# Patient Record
Sex: FEMALE | Race: WHITE | NOT HISPANIC OR LATINO | Employment: OTHER | URBAN - METROPOLITAN AREA
[De-identification: names, ages, dates, MRNs, and addresses within clinical notes are randomized per-mention and may not be internally consistent; named-entity substitution may affect disease eponyms.]

---

## 2023-01-30 PROBLEM — K21.9 GERD (GASTROESOPHAGEAL REFLUX DISEASE): Status: ACTIVE | Noted: 2023-01-30

## 2023-01-30 PROBLEM — E78.5 HLD (HYPERLIPIDEMIA): Status: ACTIVE | Noted: 2023-01-30

## 2023-01-30 PROBLEM — M16.11 PRIMARY OSTEOARTHRITIS OF RIGHT HIP: Status: ACTIVE | Noted: 2023-01-30

## 2023-01-30 PROBLEM — M85.80 OSTEOPENIA: Status: ACTIVE | Noted: 2023-01-30

## 2023-01-30 PROBLEM — E55.9 VITAMIN D DEFICIENCY: Status: ACTIVE | Noted: 2023-01-30

## 2023-01-30 PROBLEM — E03.9 HYPOTHYROIDISM: Status: ACTIVE | Noted: 2023-01-30

## 2023-01-30 PROBLEM — I45.10 INCOMPLETE RIGHT BUNDLE BRANCH BLOCK (RBBB): Status: ACTIVE | Noted: 2023-01-30

## 2023-01-30 PROBLEM — K57.30 DIVERTICULOSIS OF SIGMOID COLON: Status: ACTIVE | Noted: 2023-01-30

## 2023-01-30 PROBLEM — D47.3 ESSENTIAL THROMBOCYTHEMIA (MULTI): Status: ACTIVE | Noted: 2023-01-30

## 2023-01-30 RX ORDER — FOLIC ACID 1 MG/1
1 TABLET ORAL DAILY
COMMUNITY
Start: 2022-05-28 | End: 2023-03-31 | Stop reason: ALTCHOICE

## 2023-01-30 RX ORDER — LANOLIN ALCOHOL/MO/W.PET/CERES
1 CREAM (GRAM) TOPICAL DAILY
COMMUNITY
Start: 2022-05-28 | End: 2023-03-31 | Stop reason: ALTCHOICE

## 2023-01-30 RX ORDER — ATORVASTATIN CALCIUM 40 MG/1
1 TABLET, FILM COATED ORAL DAILY
COMMUNITY
Start: 2016-11-10 | End: 2023-03-31 | Stop reason: SDUPTHER

## 2023-01-30 RX ORDER — LEVOTHYROXINE SODIUM 100 UG/1
1 TABLET ORAL DAILY
COMMUNITY
Start: 2014-03-16 | End: 2023-03-31 | Stop reason: SDUPTHER

## 2023-01-30 RX ORDER — HYDROXYUREA 500 MG/1
1 CAPSULE ORAL DAILY
COMMUNITY
Start: 2022-08-16 | End: 2024-04-25 | Stop reason: SDUPTHER

## 2023-01-30 RX ORDER — ALENDRONATE SODIUM 35 MG/1
35 TABLET ORAL
COMMUNITY
Start: 2019-04-01 | End: 2023-03-31 | Stop reason: SDUPTHER

## 2023-01-30 RX ORDER — CHOLECALCIFEROL (VITAMIN D3) 50 MCG
1 TABLET ORAL DAILY
COMMUNITY

## 2023-03-14 ENCOUNTER — APPOINTMENT (OUTPATIENT)
Dept: PRIMARY CARE | Facility: CLINIC | Age: 78
End: 2023-03-14
Payer: MEDICARE

## 2023-03-31 ENCOUNTER — OFFICE VISIT (OUTPATIENT)
Dept: PRIMARY CARE | Facility: CLINIC | Age: 78
End: 2023-03-31
Payer: MEDICARE

## 2023-03-31 VITALS
DIASTOLIC BLOOD PRESSURE: 80 MMHG | TEMPERATURE: 97.5 F | WEIGHT: 169 LBS | HEIGHT: 67 IN | SYSTOLIC BLOOD PRESSURE: 122 MMHG | BODY MASS INDEX: 26.53 KG/M2

## 2023-03-31 DIAGNOSIS — Z78.0 POSTMENOPAUSAL: ICD-10-CM

## 2023-03-31 DIAGNOSIS — E03.9 HYPOTHYROIDISM, UNSPECIFIED TYPE: Primary | ICD-10-CM

## 2023-03-31 DIAGNOSIS — D47.3 ESSENTIAL THROMBOCYTHEMIA (MULTI): ICD-10-CM

## 2023-03-31 DIAGNOSIS — K21.9 GASTROESOPHAGEAL REFLUX DISEASE WITHOUT ESOPHAGITIS: ICD-10-CM

## 2023-03-31 DIAGNOSIS — E55.9 VITAMIN D DEFICIENCY: ICD-10-CM

## 2023-03-31 DIAGNOSIS — M85.80 OSTEOPENIA, UNSPECIFIED LOCATION: ICD-10-CM

## 2023-03-31 PROBLEM — M16.10 PRIMARY LOCALIZED OSTEOARTHRITIS OF PELVIC REGION AND THIGH: Status: ACTIVE | Noted: 2022-06-09

## 2023-03-31 LAB
ALANINE AMINOTRANSFERASE (SGPT) (U/L) IN SER/PLAS: 12 U/L (ref 7–45)
ALBUMIN (G/DL) IN SER/PLAS: 4.1 G/DL (ref 3.4–5)
ALKALINE PHOSPHATASE (U/L) IN SER/PLAS: 68 U/L (ref 33–136)
ANION GAP IN SER/PLAS: 16 MMOL/L (ref 10–20)
ASPARTATE AMINOTRANSFERASE (SGOT) (U/L) IN SER/PLAS: 14 U/L (ref 9–39)
BILIRUBIN TOTAL (MG/DL) IN SER/PLAS: 0.7 MG/DL (ref 0–1.2)
CALCIDIOL (25 OH VITAMIN D3) (NG/ML) IN SER/PLAS: 81 NG/ML
CALCIUM (MG/DL) IN SER/PLAS: 8.8 MG/DL (ref 8.6–10.6)
CARBON DIOXIDE, TOTAL (MMOL/L) IN SER/PLAS: 26 MMOL/L (ref 21–32)
CHLORIDE (MMOL/L) IN SER/PLAS: 108 MMOL/L (ref 98–107)
CHOLESTEROL (MG/DL) IN SER/PLAS: 154 MG/DL (ref 0–199)
CHOLESTEROL IN HDL (MG/DL) IN SER/PLAS: 69.9 MG/DL
CHOLESTEROL/HDL RATIO: 2.2
CREATININE (MG/DL) IN SER/PLAS: 0.92 MG/DL (ref 0.5–1.05)
ERYTHROCYTE DISTRIBUTION WIDTH (RATIO) BY AUTOMATED COUNT: 12.5 % (ref 11.5–14.5)
ERYTHROCYTE MEAN CORPUSCULAR HEMOGLOBIN CONCENTRATION (G/DL) BY AUTOMATED: 31.8 G/DL (ref 32–36)
ERYTHROCYTE MEAN CORPUSCULAR VOLUME (FL) BY AUTOMATED COUNT: 117 FL (ref 80–100)
ERYTHROCYTES (10*6/UL) IN BLOOD BY AUTOMATED COUNT: 3.61 X10E12/L (ref 4–5.2)
GFR FEMALE: 64 ML/MIN/1.73M2
GLUCOSE (MG/DL) IN SER/PLAS: 93 MG/DL (ref 74–99)
HEMATOCRIT (%) IN BLOOD BY AUTOMATED COUNT: 42.2 % (ref 36–46)
HEMOGLOBIN (G/DL) IN BLOOD: 13.4 G/DL (ref 12–16)
LDL: 74 MG/DL (ref 0–99)
LEUKOCYTES (10*3/UL) IN BLOOD BY AUTOMATED COUNT: 5.1 X10E9/L (ref 4.4–11.3)
NRBC (PER 100 WBCS) BY AUTOMATED COUNT: 0 /100 WBC (ref 0–0)
PLATELETS (10*3/UL) IN BLOOD AUTOMATED COUNT: 316 X10E9/L (ref 150–450)
POTASSIUM (MMOL/L) IN SER/PLAS: 4.8 MMOL/L (ref 3.5–5.3)
PROTEIN TOTAL: 6.5 G/DL (ref 6.4–8.2)
SODIUM (MMOL/L) IN SER/PLAS: 145 MMOL/L (ref 136–145)
THYROTROPIN (MIU/L) IN SER/PLAS BY DETECTION LIMIT <= 0.05 MIU/L: 1.62 MIU/L (ref 0.44–3.98)
TRIGLYCERIDE (MG/DL) IN SER/PLAS: 52 MG/DL (ref 0–149)
UREA NITROGEN (MG/DL) IN SER/PLAS: 17 MG/DL (ref 6–23)
VLDL: 10 MG/DL (ref 0–40)

## 2023-03-31 PROCEDURE — 85027 COMPLETE CBC AUTOMATED: CPT

## 2023-03-31 PROCEDURE — 99214 OFFICE O/P EST MOD 30 MIN: CPT | Performed by: FAMILY MEDICINE

## 2023-03-31 PROCEDURE — 36415 COLL VENOUS BLD VENIPUNCTURE: CPT | Performed by: FAMILY MEDICINE

## 2023-03-31 PROCEDURE — 1159F MED LIST DOCD IN RCRD: CPT | Performed by: FAMILY MEDICINE

## 2023-03-31 PROCEDURE — 82306 VITAMIN D 25 HYDROXY: CPT

## 2023-03-31 PROCEDURE — 84443 ASSAY THYROID STIM HORMONE: CPT

## 2023-03-31 PROCEDURE — 80061 LIPID PANEL: CPT

## 2023-03-31 PROCEDURE — 1036F TOBACCO NON-USER: CPT | Performed by: FAMILY MEDICINE

## 2023-03-31 PROCEDURE — 80053 COMPREHEN METABOLIC PANEL: CPT

## 2023-03-31 RX ORDER — LEVOTHYROXINE SODIUM 100 UG/1
100 TABLET ORAL
Qty: 90 TABLET | Refills: 3 | Status: SHIPPED | OUTPATIENT
Start: 2023-03-31 | End: 2024-04-24 | Stop reason: SDUPTHER

## 2023-03-31 RX ORDER — ATORVASTATIN CALCIUM 40 MG/1
40 TABLET, FILM COATED ORAL DAILY
Qty: 90 TABLET | Refills: 3 | Status: SHIPPED | OUTPATIENT
Start: 2023-03-31 | End: 2024-04-24 | Stop reason: SDUPTHER

## 2023-03-31 RX ORDER — ALENDRONATE SODIUM 35 MG/1
35 TABLET ORAL
Qty: 12 TABLET | Refills: 3 | Status: SHIPPED | OUTPATIENT
Start: 2023-03-31 | End: 2024-03-12 | Stop reason: SDUPTHER

## 2023-03-31 ASSESSMENT — PATIENT HEALTH QUESTIONNAIRE - PHQ9
SUM OF ALL RESPONSES TO PHQ9 QUESTIONS 1 AND 2: 0
1. LITTLE INTEREST OR PLEASURE IN DOING THINGS: NOT AT ALL
2. FEELING DOWN, DEPRESSED OR HOPELESS: NOT AT ALL

## 2023-03-31 NOTE — PROGRESS NOTES
"Chief complaint:   Chief Complaint   Patient presents with    Follow-up     6 month       HPI:  Fernanda Sotelo is a 78 y.o. female who presents for evaluation and follow up.     Physical exam:  /80 (BP Location: Right arm, Patient Position: Sitting)   Temp 36.4 °C (97.5 °F)   Ht 1.702 m (5' 7\")   Wt 76.7 kg (169 lb)   BMI 26.47 kg/m²   General: NAD, well appearing female  Heart: RRR, no mumur appreciated  Lungs: CTAB, no wheezes, rales, rhonchi  Abdomen: soft, non tender, normoactive BS, no organomegaly  Extremities: No LE edema    Assessment/Plan   Problem List Items Addressed This Visit          Digestive    GERD (gastroesophageal reflux disease)       Musculoskeletal    Osteopenia    Relevant Medications    alendronate (Fosamax) 35 mg tablet    Other Relevant Orders    XR DEXA bone density       Endocrine/Metabolic    Hypothyroidism - Primary    Relevant Medications    atorvastatin (Lipitor) 40 mg tablet    levothyroxine (Synthroid, Levoxyl) 100 mcg tablet    Other Relevant Orders    CBC    Comprehensive metabolic panel    Tsh With Reflex To Free T4 If Abnormal    Lipid panel    Vitamin D deficiency    Relevant Orders    Vitamin D 25-Hydroxy,Total       Hematologic    Essential thrombocythemia (CMS/HCC)     Other Visit Diagnoses       Postmenopausal        Relevant Orders    XR DEXA bone density        Follow up 6 mo   Labs drawn today    Maria De Jesus Mckay, DO        "

## 2023-04-05 ENCOUNTER — TELEPHONE (OUTPATIENT)
Dept: PRIMARY CARE | Facility: CLINIC | Age: 78
End: 2023-04-05
Payer: MEDICARE

## 2023-06-09 ENCOUNTER — TELEPHONE (OUTPATIENT)
Dept: PRIMARY CARE | Facility: CLINIC | Age: 78
End: 2023-06-09
Payer: MEDICARE

## 2023-06-09 NOTE — TELEPHONE ENCOUNTER
----- Message from Maria De Jesus Mckay DO sent at 6/9/2023 12:11 PM EDT -----  This patient has Osteopenia based on this result and should continue taking two TUMS+D per day and continue the Alendronate. I would suggest working out with weights three times weekly for 30 minutes and consuming no more than one coffee, tea, soda or alcoholic drink total, per day.

## 2023-06-13 ENCOUNTER — TELEPHONE (OUTPATIENT)
Dept: PRIMARY CARE | Facility: CLINIC | Age: 78
End: 2023-06-13
Payer: MEDICARE

## 2023-09-26 ENCOUNTER — APPOINTMENT (OUTPATIENT)
Dept: PRIMARY CARE | Facility: CLINIC | Age: 78
End: 2023-09-26
Payer: MEDICARE

## 2023-10-11 ENCOUNTER — APPOINTMENT (OUTPATIENT)
Dept: PRIMARY CARE | Facility: CLINIC | Age: 78
End: 2023-10-11
Payer: MEDICARE

## 2023-10-25 ENCOUNTER — OFFICE VISIT (OUTPATIENT)
Dept: PRIMARY CARE | Facility: CLINIC | Age: 78
End: 2023-10-25
Payer: MEDICARE

## 2023-10-25 VITALS
BODY MASS INDEX: 26.21 KG/M2 | HEIGHT: 67 IN | DIASTOLIC BLOOD PRESSURE: 72 MMHG | WEIGHT: 167 LBS | SYSTOLIC BLOOD PRESSURE: 122 MMHG

## 2023-10-25 DIAGNOSIS — M85.80 OSTEOPENIA, UNSPECIFIED LOCATION: ICD-10-CM

## 2023-10-25 DIAGNOSIS — D47.3 ESSENTIAL THROMBOCYTHEMIA (MULTI): ICD-10-CM

## 2023-10-25 DIAGNOSIS — E78.2 MIXED HYPERLIPIDEMIA: ICD-10-CM

## 2023-10-25 DIAGNOSIS — I45.10 INCOMPLETE RIGHT BUNDLE BRANCH BLOCK (RBBB): ICD-10-CM

## 2023-10-25 DIAGNOSIS — E55.9 VITAMIN D DEFICIENCY: ICD-10-CM

## 2023-10-25 DIAGNOSIS — K57.30 DIVERTICULOSIS OF SIGMOID COLON: ICD-10-CM

## 2023-10-25 DIAGNOSIS — Z00.00 ROUTINE GENERAL MEDICAL EXAMINATION AT HEALTH CARE FACILITY: Primary | ICD-10-CM

## 2023-10-25 DIAGNOSIS — E03.9 HYPOTHYROIDISM, UNSPECIFIED TYPE: ICD-10-CM

## 2023-10-25 DIAGNOSIS — K21.9 GASTROESOPHAGEAL REFLUX DISEASE WITHOUT ESOPHAGITIS: ICD-10-CM

## 2023-10-25 PROCEDURE — G0439 PPPS, SUBSEQ VISIT: HCPCS | Performed by: FAMILY MEDICINE

## 2023-10-25 PROCEDURE — 1036F TOBACCO NON-USER: CPT | Performed by: FAMILY MEDICINE

## 2023-10-25 PROCEDURE — 1159F MED LIST DOCD IN RCRD: CPT | Performed by: FAMILY MEDICINE

## 2023-10-25 PROCEDURE — 1170F FXNL STATUS ASSESSED: CPT | Performed by: FAMILY MEDICINE

## 2023-10-25 PROCEDURE — 1160F RVW MEDS BY RX/DR IN RCRD: CPT | Performed by: FAMILY MEDICINE

## 2023-10-25 ASSESSMENT — ACTIVITIES OF DAILY LIVING (ADL)
MANAGING_FINANCES: INDEPENDENT
GROCERY_SHOPPING: INDEPENDENT
DRESSING: INDEPENDENT
TAKING_MEDICATION: INDEPENDENT
BATHING: INDEPENDENT
DOING_HOUSEWORK: INDEPENDENT

## 2023-10-25 NOTE — PROGRESS NOTES
"Subjective   Reason for Visit: Fernanda Sotelo is an 78 y.o. female here for a Medicare Wellness visit.     Past Medical, Surgical, and Family History reviewed and updated in chart.         HPI  Fernanda Sotelo is a 78 year old female who presents for her MWV. Overall she is doing well.     Patient Care Team:  Maria De Jesus Mckay DO as PCP - General  Maria De Jesus Mckay DO as PCP - [employer] PCP     Review of Systems    Objective   Vitals:  /72   Ht 1.702 m (5' 7\")   Wt 75.8 kg (167 lb)   BMI 26.16 kg/m²       Physical Exam  Constitutional:       Appearance: Normal appearance.   HENT:      Head: Normocephalic and atraumatic.   Cardiovascular:      Rate and Rhythm: Normal rate and regular rhythm.      Heart sounds: Normal heart sounds.   Pulmonary:      Effort: Pulmonary effort is normal.      Breath sounds: Normal breath sounds.   Abdominal:      General: Abdomen is flat.      Palpations: Abdomen is soft.   Skin:     General: Skin is warm and dry.   Neurological:      General: No focal deficit present.      Mental Status: She is alert.         Assessment/Plan   Problem List Items Addressed This Visit       Diverticulosis of sigmoid colon    Essential thrombocythemia (CMS/[facility])    Current Assessment & Plan     - currently on Hydroxyurea  - continue care with hematology: Dr. Johnson         GERD (gastroesophageal reflux disease)    Current Assessment & Plan     - Tums PRN, Prevacid PRN  - follow up if sx 2+ times weekly on average         HLD (hyperlipidemia)    Current Assessment & Plan     - continue Atorvastatin 40 mg PO at bedtime  - last lipids: 3/2023         Hypothyroidism    Current Assessment & Plan     - continue Levothyroxine 100 mcg PO daily  - last labs 3/2023         Incomplete right bundle branch block (RBBB)    Current Assessment & Plan     - last ECHO 6/2022  - continue are with cardiology         Osteopenia    Current Assessment & Plan     - last BMD 6/2021  - has been on Alendronate " since 4/2019         Vitamin D deficiency     Other Visit Diagnoses       Routine general medical examination at health care facility    -  Primary    Relevant Orders    1 Year Follow Up In Primary Care - Wellness Exam            Colon cancer screening: Colonoscopy 11/2017, due 11/2027  Breast cancer screening: last mammogram 11/2021 (age >74)     Reviewed vaccinations, Shingrix discussed and she will look into coverage, Tetanus discussed previously

## 2024-01-02 NOTE — PROGRESS NOTES
Patient ID: Fernanda Sotelo is a 78 y.o. female.    Subjective    HPI  Ms. Sotelo is a 79 y/o F who is presenting for follow-up for polycythemia vera.      NGS MPN revealed a JAK2 mutation, which along with her thrombocytosis, is consistent with essential thrombocytopenia. Patient presents today with questions about essential thrombocythemia.      Interval Events  11/18/2022: Most recent blood work from today, 11/18/2022, shows a hemoglobin of 13.4, hematocrit of 41.7, and her platelets are normal. Today, she reports feeling well. Denies any new side effects  or rashes. She has a good appetite. No feelings of getting full more easily. She does not feel any better or any worse since starting treatment. Patient BP has been slightly elevated. Her BP this morning at home was 138/81. She checks this every couple  of days and this is an average reading for her.      2/17/2023: Most recent blood work from the end of 1/2023 shows that her counts were at goal with hematocrit at 41 and hemoglobin 13.7. Labs from today, 2/17/2023, reveal a WBC of 4.7, hemoglobin 13.5, and hematocrit 40.8, with normal platelets of 700039.  Denies SOB, fatigue, edema, mouth sores, new rashes, and flushing. Of note, she was seen in the ER 2 weeks ago for vertigo. She had woken up to the room spinning and she was unable to stand up. This had never occurred before. Patients BP this morning  at home was approximately 135/81. She takes her BP every other day and it stays around this number.      6/16/2023: Patient presents for follow-up for polycythemia vera. Blood work from today is pending.      Since last visit, patient reports an itchy back. No new ulcers or rashes. Denies any new cough, chest pain or SOB.     9/8/2023: 3 month follow up. Reports doing very well.   Denies n/v/d/c, falls, chest pain, SOB, melena, hematochezia, fevers, chills or skin changes.     1/5/2024: Patient presents for follow-up for polycythemia vera.    Hemoglobin today  is 14. Normal platelets and WBC.     Since last visit, patient reports that overall she has been well. Denies any mouth issues. No new pain. Denies any new chest pain, cough or shortness of breath. No new rashes. No complaints today.     Patient's past medical history, surgical history, family history and social history reviewed.      Objective    Vitals:    01/05/24 1005   BP: 160/83   Pulse: 67   Resp: 16   Temp: 36.2 °C (97.2 °F)   SpO2: 96%       Review of Systems:   Review of Systems:    Positive per HPI, otherwise negative.     Physical Exam:   Constitutional: Patient appears in no acute distress.   Sitting comfortably in chair.  Eyes: EOMI, clear sclera  ENMT: mucous membranes moist, no apparent injury  Head/Neck: Neck supple, no JVD  Respiratory/Thorax: Patent airways, CTAB, normal  breath sounds, no increased work of breathing  Cardiovascular: Regular, rate and rhythm, no murmurs  Gastrointestinal: Nondistended, soft, non-tender,  no rebound tenderness or guarding, no masses palpable  Extremities: normal extremities, no cyanosis edema,  no swelling  Neurological: alert and oriented x3, nonfocal, normal  speech and hearing  Lymphatic: No palpable lymphadenopathy in cervical,  axillary  lymph nodes.  Spleen appears normal size.  Psychological: Appropriate mood and behavior, normal  affect  Skin: Warm and dry, no lesions, no rashes     Performance Status:  Symptomatic; fully ambulatory    Labs/Imaging/Pathology: personally reviewed reports and images in Epic electronic medical record system. Pertinent results as it related to the plan represented in below in assessment and plan.      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  - NGS MPN 7/7/2022 with JAK2 mutation. With this mutation and thrombocytopenia, she meets criteria for essential thrombocytopenia.  - Discussed  diagnosis and overall good prognosis. Discussed treatment would be with hydroxyurea to prevent complications such as clots and stroke.  - 7/28/2022 - Discussed recommendations for treatment with hydroxyurea given her age over 60, however she is reluctant as her  had chemo. Discussed that this would be different but she is scared to start.   - Will start Aspirin 81 daily in combination with regular phlebotomies.    - She may need phlebotomies every month to every few months.  - 8/26/2022 - Hematocrit 45.7.  - Overall tolerating hydroxyurea fine.   - She was previously on B12 and folate.  - We did discuss she can stop these and we will recheck her levels to see if they are still needed.   - I did discuss with her, although she is close to our goal I would like to increase her hydroxyurea some.  - We will increase the hydroxyurea to 2 tablets on the weekends to see if this further improves her levels.  - Macrocytosis secondary to hydroxyurea.  - 9/23/2022 - Today hemoglobin and hematocrit remain at goal. Hematocrit is 43%.  - Overall, continues to tolerate hydroxyurea.   - Her BP was elevated today; however, she takes her BP at home and states it remains in the 120s/70s.  - No splenomegaly on exam.  - Her right lower extremity, which was previously having pain, has now resolved.  - 11/18/2022 - Overall her blood counts from today are at goal: hemoglobin normal at 13.4, hematocrit 41.7, platelets 242, and WBC 4.5.  - We will continue her current dose of hydrea: 1 dose M-F and 2 tablets on the weekends.   - She denies any new symptoms.  - She does have some elevated BP and she is going to keep recordings at home and review with Dr. Mckay next month, 12/2022.      2/17/23:  - Reviewed blood work from today, hemoglobin and hematocrit at goal.   - No issues with her medication. Otherwise, she is doing well.   - We will continue her Hydrea at the current dose and extend her follow-up to 4 months.   - She will call us  with any new or worsening symptoms.   - Today her BP is elevated; however, at home it is at goal below 140/90.  - She will continue to monitor at home and if it starts to rise she will notify us.   - RTC in 4 months with labs day of.     6/16/23:  - Labs from today are pending, however no new side effect to the Hydrea.  - At this point, as long as her hemoglobin is stable we will continue at current dose and follow-up in 3 months.   - RTC in 3 months with CBC, CMP and LDH.     9/8/2023:   - 3 month FUV   - Reports doing well on Hydrea 500 mg on M-F and Hydrea 1000 mg Sundays/Saturdays   - She'd like to come back after the holidays which would be in January 2024, she will call with questions or concerns in the meantime  - At least 20 minutes of direct consultation was spent with the patient today    1/5/24:  - CBC is at goal.   - At this point, we will continue Hydrea 500 mg every day Monday-Friday and 1000 mg on Saturday and Sunday.   - Given she has been doing well with no new toxicity we will spread her follow-up to 4 months.   - RTC in 4 months with blood work day of.     RTC in 4 months with blood work day of. This note has been transcribed using a medical scribe and there is a possibility of unintentional typing misprints.          Arabella Johnson MD  Hematology/Oncology  [facility]    Scribe Attestation  By signing my name below, Jennifer FREEMAN Scribe attest that this documentation has been prepared under the direction and in the presence of Arabella Johnson MD.

## 2024-01-05 ENCOUNTER — LAB (OUTPATIENT)
Dept: LAB | Facility: CLINIC | Age: 79
End: 2024-01-05
Payer: MEDICARE

## 2024-01-05 ENCOUNTER — OFFICE VISIT (OUTPATIENT)
Dept: HEMATOLOGY/ONCOLOGY | Facility: CLINIC | Age: 79
End: 2024-01-05
Payer: MEDICARE

## 2024-01-05 VITALS
BODY MASS INDEX: 25.66 KG/M2 | OXYGEN SATURATION: 96 % | TEMPERATURE: 97.2 F | RESPIRATION RATE: 16 BRPM | DIASTOLIC BLOOD PRESSURE: 83 MMHG | SYSTOLIC BLOOD PRESSURE: 160 MMHG | HEART RATE: 67 BPM | WEIGHT: 163.8 LBS

## 2024-01-05 DIAGNOSIS — D47.3 ESSENTIAL THROMBOCYTHEMIA (MULTI): ICD-10-CM

## 2024-01-05 LAB
ALBUMIN SERPL BCP-MCNC: 4.2 G/DL (ref 3.4–5)
ALP SERPL-CCNC: 54 U/L (ref 33–136)
ALT SERPL W P-5'-P-CCNC: 14 U/L (ref 7–45)
ANION GAP SERPL CALC-SCNC: 11 MMOL/L (ref 10–20)
AST SERPL W P-5'-P-CCNC: 15 U/L (ref 9–39)
BASOPHILS # BLD AUTO: 0.02 X10*3/UL (ref 0–0.1)
BASOPHILS NFR BLD AUTO: 0.4 %
BILIRUB SERPL-MCNC: 0.8 MG/DL (ref 0–1.2)
BUN SERPL-MCNC: 18 MG/DL (ref 6–23)
CALCIUM SERPL-MCNC: 9.5 MG/DL (ref 8.6–10.3)
CHLORIDE SERPL-SCNC: 106 MMOL/L (ref 98–107)
CO2 SERPL-SCNC: 29 MMOL/L (ref 21–32)
CREAT SERPL-MCNC: 0.94 MG/DL (ref 0.5–1.05)
EOSINOPHIL # BLD AUTO: 0.06 X10*3/UL (ref 0–0.4)
EOSINOPHIL NFR BLD AUTO: 1.2 %
ERYTHROCYTE [DISTWIDTH] IN BLOOD BY AUTOMATED COUNT: 13.4 % (ref 11.5–14.5)
GFR SERPL CREATININE-BSD FRML MDRD: 63 ML/MIN/1.73M*2
GIANT PLATELETS BLD QL SMEAR: NORMAL
GLUCOSE SERPL-MCNC: 100 MG/DL (ref 74–99)
HCT VFR BLD AUTO: 42.9 % (ref 36–46)
HGB BLD-MCNC: 14 G/DL (ref 12–16)
IMM GRANULOCYTES # BLD AUTO: 0.01 X10*3/UL (ref 0–0.5)
IMM GRANULOCYTES NFR BLD AUTO: 0.2 % (ref 0–0.9)
LDH SERPL L TO P-CCNC: 167 U/L (ref 84–246)
LYMPHOCYTES # BLD AUTO: 1.16 X10*3/UL (ref 0.8–3)
LYMPHOCYTES NFR BLD AUTO: 23.1 %
MCH RBC QN AUTO: 38 PG (ref 26–34)
MCHC RBC AUTO-ENTMCNC: 32.6 G/DL (ref 32–36)
MCV RBC AUTO: 117 FL (ref 80–100)
MONOCYTES # BLD AUTO: 0.28 X10*3/UL (ref 0.05–0.8)
MONOCYTES NFR BLD AUTO: 5.6 %
NEUTROPHILS # BLD AUTO: 3.5 X10*3/UL (ref 1.6–5.5)
NEUTROPHILS NFR BLD AUTO: 69.5 %
NRBC BLD-RTO: ABNORMAL /100{WBCS}
PATH REVIEW-CBC DIFFERENTIAL: NORMAL
PLATELET # BLD AUTO: 314 X10*3/UL (ref 150–450)
POTASSIUM SERPL-SCNC: 4.1 MMOL/L (ref 3.5–5.3)
PROT SERPL-MCNC: 6.8 G/DL (ref 6.4–8.2)
RBC # BLD AUTO: 3.68 X10*6/UL (ref 4–5.2)
RBC MORPH BLD: NORMAL
SODIUM SERPL-SCNC: 142 MMOL/L (ref 136–145)
WBC # BLD AUTO: 5 X10*3/UL (ref 4.4–11.3)

## 2024-01-05 PROCEDURE — 85060 BLOOD SMEAR INTERPRETATION: CPT | Performed by: PATHOLOGY

## 2024-01-05 PROCEDURE — 99214 OFFICE O/P EST MOD 30 MIN: CPT | Performed by: INTERNAL MEDICINE

## 2024-01-05 PROCEDURE — 36415 COLL VENOUS BLD VENIPUNCTURE: CPT

## 2024-01-05 PROCEDURE — 1126F AMNT PAIN NOTED NONE PRSNT: CPT | Performed by: INTERNAL MEDICINE

## 2024-01-05 PROCEDURE — 83615 LACTATE (LD) (LDH) ENZYME: CPT

## 2024-01-05 PROCEDURE — 1036F TOBACCO NON-USER: CPT | Performed by: INTERNAL MEDICINE

## 2024-01-05 PROCEDURE — 85025 COMPLETE CBC W/AUTO DIFF WBC: CPT

## 2024-01-05 PROCEDURE — 84075 ASSAY ALKALINE PHOSPHATASE: CPT

## 2024-01-05 ASSESSMENT — PAIN SCALES - GENERAL: PAINLEVEL: 0-NO PAIN

## 2024-03-12 ENCOUNTER — TELEPHONE (OUTPATIENT)
Dept: PRIMARY CARE | Facility: CLINIC | Age: 79
End: 2024-03-12
Payer: MEDICARE

## 2024-03-12 DIAGNOSIS — M85.80 OSTEOPENIA, UNSPECIFIED LOCATION: ICD-10-CM

## 2024-03-12 RX ORDER — ALENDRONATE SODIUM 35 MG/1
35 TABLET ORAL
Qty: 12 TABLET | Refills: 3 | Status: SHIPPED | OUTPATIENT
Start: 2024-03-12 | End: 2024-04-24

## 2024-04-22 ENCOUNTER — TELEPHONE (OUTPATIENT)
Dept: HEMATOLOGY/ONCOLOGY | Facility: CLINIC | Age: 79
End: 2024-04-22
Payer: MEDICARE

## 2024-04-22 NOTE — TELEPHONE ENCOUNTER
Refill on Hydroxyurea 500mg.  Send to [facility] on [address] [place].  Patient has 1 week left and doesn't see Dr. Johnson until 5/20/24.

## 2024-04-24 ENCOUNTER — OFFICE VISIT (OUTPATIENT)
Dept: PRIMARY CARE | Facility: CLINIC | Age: 79
End: 2024-04-24
Payer: MEDICARE

## 2024-04-24 VITALS
WEIGHT: 164 LBS | TEMPERATURE: 96.8 F | SYSTOLIC BLOOD PRESSURE: 124 MMHG | BODY MASS INDEX: 25.74 KG/M2 | DIASTOLIC BLOOD PRESSURE: 74 MMHG | HEIGHT: 67 IN

## 2024-04-24 DIAGNOSIS — M85.80 OSTEOPENIA, UNSPECIFIED LOCATION: ICD-10-CM

## 2024-04-24 DIAGNOSIS — E03.9 HYPOTHYROIDISM, UNSPECIFIED TYPE: ICD-10-CM

## 2024-04-24 DIAGNOSIS — Z00.00 ROUTINE GENERAL MEDICAL EXAMINATION AT HEALTH CARE FACILITY: Primary | ICD-10-CM

## 2024-04-24 DIAGNOSIS — E78.2 MIXED HYPERLIPIDEMIA: ICD-10-CM

## 2024-04-24 DIAGNOSIS — E55.9 VITAMIN D DEFICIENCY: ICD-10-CM

## 2024-04-24 PROCEDURE — 1159F MED LIST DOCD IN RCRD: CPT | Performed by: FAMILY MEDICINE

## 2024-04-24 PROCEDURE — 1123F ACP DISCUSS/DSCN MKR DOCD: CPT | Performed by: FAMILY MEDICINE

## 2024-04-24 PROCEDURE — G0439 PPPS, SUBSEQ VISIT: HCPCS | Performed by: FAMILY MEDICINE

## 2024-04-24 PROCEDURE — 1160F RVW MEDS BY RX/DR IN RCRD: CPT | Performed by: FAMILY MEDICINE

## 2024-04-24 PROCEDURE — 1170F FXNL STATUS ASSESSED: CPT | Performed by: FAMILY MEDICINE

## 2024-04-24 PROCEDURE — 99213 OFFICE O/P EST LOW 20 MIN: CPT | Performed by: FAMILY MEDICINE

## 2024-04-24 PROCEDURE — 1036F TOBACCO NON-USER: CPT | Performed by: FAMILY MEDICINE

## 2024-04-24 RX ORDER — LEVOTHYROXINE SODIUM 100 UG/1
100 TABLET ORAL
Qty: 90 TABLET | Refills: 3 | Status: SHIPPED | OUTPATIENT
Start: 2024-04-24

## 2024-04-24 RX ORDER — ATORVASTATIN CALCIUM 40 MG/1
40 TABLET, FILM COATED ORAL DAILY
Qty: 90 TABLET | Refills: 3 | Status: SHIPPED | OUTPATIENT
Start: 2024-04-24

## 2024-04-24 ASSESSMENT — ENCOUNTER SYMPTOMS
POLYPHAGIA: 0
WHEEZING: 0
RHINORRHEA: 0
HEMATURIA: 0
SHORTNESS OF BREATH: 0
ABDOMINAL PAIN: 0
FACIAL SWELLING: 0
SLEEP DISTURBANCE: 0
DYSURIA: 0
CONSTIPATION: 0
DIZZINESS: 0
CHILLS: 0
BRUISES/BLEEDS EASILY: 0
DIARRHEA: 0
DYSPHORIC MOOD: 0
PALPITATIONS: 0
ADENOPATHY: 0
NAUSEA: 0
WOUND: 0
WEAKNESS: 0
NECK PAIN: 0
POLYDIPSIA: 0
SORE THROAT: 0
BACK PAIN: 0
DECREASED CONCENTRATION: 0
FEVER: 0
NUMBNESS: 0
ARTHRALGIAS: 1
HEADACHES: 0
COUGH: 0
VOMITING: 0

## 2024-04-24 ASSESSMENT — ACTIVITIES OF DAILY LIVING (ADL)
GROCERY_SHOPPING: INDEPENDENT
MANAGING_FINANCES: INDEPENDENT
DOING_HOUSEWORK: INDEPENDENT
DRESSING: INDEPENDENT
TAKING_MEDICATION: INDEPENDENT
BATHING: INDEPENDENT

## 2024-04-24 NOTE — PROGRESS NOTES
"Subjective   Reason for Visit: Fernanda Sotelo is an 79 y.o. female here for a Medicare Wellness visit.     Past Medical, Surgical, and Family History reviewed and updated in chart.    Reviewed all medications by prescribing practitioner or clinical pharmacist (such as prescriptions, OTCs, herbal therapies and supplements) and documented in the medical record.    HPI    Fernanda Sotelo is a 79 year old female who presents for evaluation. No acute concerns.    Patient Care Team:  Maria De Jesus Mckay DO as PCP - General  Maria De Jesus Mckay DO as PCP - [employer] PCP  Arabella Johnson MD as Consulting Physician (Hematology and Oncology)     Review of Systems   Constitutional:  Negative for chills and fever.   HENT:  Negative for facial swelling, rhinorrhea and sore throat.    Eyes:  Negative for visual disturbance.   Respiratory:  Negative for cough, shortness of breath and wheezing.    Cardiovascular:  Negative for chest pain, palpitations and leg swelling.   Gastrointestinal:  Negative for abdominal pain, constipation, diarrhea, nausea and vomiting.   Endocrine: Negative for polydipsia, polyphagia and polyuria.   Genitourinary:  Negative for dysuria and hematuria.   Musculoskeletal:  Positive for arthralgias (mild). Negative for back pain and neck pain.   Skin:  Negative for rash and wound.   Neurological:  Negative for dizziness, weakness, numbness and headaches.   Hematological:  Negative for adenopathy. Does not bruise/bleed easily.   Psychiatric/Behavioral:  Negative for decreased concentration, dysphoric mood and sleep disturbance.        Objective   Vitals:  /74   Temp 36 °C (96.8 °F)   Ht 1.689 m (5' 6.5\")   Wt 74.4 kg (164 lb)   BMI 26.07 kg/m²       Physical Exam  Constitutional:       Appearance: Normal appearance.   HENT:      Head: Normocephalic and atraumatic.      Right Ear: Tympanic membrane normal.      Left Ear: Tympanic membrane normal.      Nose: Nose normal.      Mouth/Throat:     "  Mouth: Mucous membranes are moist.   Eyes:      Conjunctiva/sclera: Conjunctivae normal.   Cardiovascular:      Rate and Rhythm: Normal rate and regular rhythm.      Pulses: Normal pulses.      Heart sounds: Normal heart sounds.   Pulmonary:      Effort: Pulmonary effort is normal.      Breath sounds: Normal breath sounds.   Abdominal:      General: Abdomen is flat. Bowel sounds are normal.      Palpations: Abdomen is soft.   Skin:     General: Skin is warm and dry.      Capillary Refill: Capillary refill takes less than 2 seconds.   Neurological:      General: No focal deficit present.      Mental Status: She is alert.   Psychiatric:         Mood and Affect: Mood normal.         Assessment/Plan   Problem List Items Addressed This Visit       HLD (hyperlipidemia)    Current Assessment & Plan     - continue Atorvastatin 40 mg PO at bedtime  - last lipids: 3/2023, ordered repeat testing         Hypothyroidism    Current Assessment & Plan     - continue Levothyroxine 100 mcg PO daily  - last labs 3/2023, repeat testing ordered         Relevant Medications    atorvastatin (Lipitor) 40 mg tablet    levothyroxine (Synthroid, Levoxyl) 100 mcg tablet    Other Relevant Orders    Tsh With Reflex To Free T4 If Abnormal    Lipid panel    Osteopenia    Current Assessment & Plan     - last BMD 6/2/2023  - has been on Alendronate since 4/2019, will stop therapy (4/2024)         Vitamin D deficiency    Current Assessment & Plan     -ordered labs, continue supplementation with 2000 international units  once daily         Relevant Orders    Vitamin D 25-Hydroxy,Total (for eval of Vitamin D levels)     Other Visit Diagnoses       Routine general medical examination at health care facility    -  Primary    Relevant Orders    1 Year Follow Up In Primary Care - Wellness Exam        She is age > 74 and comfortable not continuing breast cancer screening. Last mammogram 2021 and wnl  Last colonoscopy 11/20217, due 11/2027  Shingrix  vaccinations again discussed, she will look into getting this  Tdap booster is recommended and she will look into getting this.

## 2024-04-25 DIAGNOSIS — D47.3 ESSENTIAL THROMBOCYTHEMIA (MULTI): Primary | ICD-10-CM

## 2024-04-25 RX ORDER — HYDROXYUREA 500 MG/1
CAPSULE ORAL
Qty: 115 CAPSULE | Refills: 1 | Status: SHIPPED | OUTPATIENT
Start: 2024-04-25

## 2024-04-25 NOTE — TELEPHONE ENCOUNTER
Patient following up on this- went to pharmacy today and they did not have an order.  [facility] on [address]  Please call patient when sent.

## 2024-04-25 NOTE — TELEPHONE ENCOUNTER
Fernanda Sotelo is a 79 y.o. year old female patient who had a patient care encounter with Dr. Johnson most recently on 1/5/24 for ongoing management of her MF.  Fernanda is currently being treated with hydroxyurea.    Most recent labs on 1/5/24:  WBC 5.0, ANC 3.50, H/H 14.0/42.9, PLTs 314;  CMP stable, SCr 0.94.  Continue current therapy.    Per collaborative practice agreement with Dr. Johnson, on 4/25/24 I refilled hydroxyurea 500 mg PO once daily M - F and 1000 mg PO once daily Sat - Sun, 90-day cycle.  Qty # 115 with 1 refill.  The prescription was sent to [facility].    Next FUV is 5/20/24.      Dionte Smith RP, MS, BCOP  Clinical Pharmacist Specialist - Ambulatory Oncology

## 2024-04-26 ENCOUNTER — APPOINTMENT (OUTPATIENT)
Dept: PRIMARY CARE | Facility: CLINIC | Age: 79
End: 2024-04-26
Payer: MEDICARE

## 2024-05-16 NOTE — PROGRESS NOTES
Patient ID: Fernanda Sotelo is a 79 y.o. female.    Subjective    HPI  Ms. Sotelo is a 80 y/o F who is presenting for follow-up for polycythemia vera.      NGS MPN revealed a JAK2 mutation, which along with her thrombocytosis, is consistent with essential thrombocytopenia. Patient presents today with questions about essential thrombocythemia.      Interval Events  11/18/2022: Most recent blood work from today, 11/18/2022, shows a hemoglobin of 13.4, hematocrit of 41.7, and her platelets are normal. Today, she reports feeling well. Denies any new side effects  or rashes. She has a good appetite. No feelings of getting full more easily. She does not feel any better or any worse since starting treatment. Patient BP has been slightly elevated. Her BP this morning at home was 138/81. She checks this every couple  of days and this is an average reading for her.      2/17/2023: Most recent blood work from the end of 1/2023 shows that her counts were at goal with hematocrit at 41 and hemoglobin 13.7. Labs from today, 2/17/2023, reveal a WBC of 4.7, hemoglobin 13.5, and hematocrit 40.8, with normal platelets of [id number].  Denies SOB, fatigue, edema, mouth sores, new rashes, and flushing. Of note, she was seen in the ER 2 weeks ago for vertigo. She had woken up to the room spinning and she was unable to stand up. This had never occurred before. Patients BP this morning  at home was approximately 135/81. She takes her BP every other day and it stays around this number.      6/16/2023: Patient presents for follow-up for polycythemia vera. Blood work from today is pending.      Since last visit, patient reports an itchy back. No new ulcers or rashes. Denies any new cough, chest pain or SOB.      9/8/2023: 3 month follow up. Reports doing very well.   Denies n/v/d/c, falls, chest pain, SOB, melena, hematochezia, fevers, chills or skin changes.      1/5/2024: Patient presents for follow-up for polycythemia vera.     Hemoglobin  today is 14. Normal platelets and WBC.      Since last visit, patient reports that overall she has been well. Denies any mouth issues. No new pain. Denies any new chest pain, cough or shortness of breath. No new rashes. No complaints today.     5/20/24: Patient presents for follow-up for polycythemia vera.     Blood work from today is still pending.     Since last visit patient reports that she is feeling well. She recently sold her home and bought a condo and looks forward to this change. She does have some reflux that is not new to her and resolves when she takes a couple of Tums. Her PCP recently took her off of her Fosamax and she wonders how her reflux will do. No itchy or rash on skin. She states that once in a while she puts some cream on her back for a slight itch but attributes this to dry skin and no other issue. No feelings of being full or new bloating. No other major complaints at this time.     Patient's past medical history, surgical history, family history and social history reviewed.    Review of Systems:   Review of Systems:    Positive per HPI, otherwise negative.   Objective      Vitals:    05/20/24 1121   BP: 152/77   Pulse: 71   Resp: 16   Temp: 36.4 °C (97.5 °F)   SpO2: 95%          Physical Exam  Gen: appears well in clinic, NAD  HEENT: atraumatic head, normocephalic, EOMI, conjunctiva normal  LUNG: no increased WOB, CTAB  CV: No JVD. RRR  GI: soft, NT, ND  LE: no LE edema  Skin: no obvious rashes or lesions on visible skin  Neuro: interactive, no focal deficits noted  Psych: normal mood and affect  Performance Status:  Symptomatic; fully ambulatory    Labs/Imaging/Pathology: personally reviewed reports and images in Epic electronic medical record system. Pertinent results as it related to the plan represented in below in assessment and plan.   Assessment/Plan   1.Polycythemia vera with Stepan 2 mutation  2.Thrombocytosis  - She has had elevated hematocrit since at least 2019 and since May [id number]  has had an elevated platelet count.  - NGS MPN 7/7/2022 with JAK2 mutation. With this mutation and thrombocytopenia, she meets criteria for essential thrombocytopenia.  - Discussed diagnosis and overall good prognosis. Discussed treatment would be with hydroxyurea to prevent complications such as clots and stroke.  - 7/28/2022 - Discussed recommendations for treatment with hydroxyurea given her age over 60, however she is reluctant as her  had chemo. Discussed that this would be different but she is scared to start.   - Will start Aspirin 81 daily in combination with regular phlebotomies.    - She may need phlebotomies every month to every few months.  - 8/26/2022 - Hematocrit 45.7.  - Overall tolerating hydroxyurea fine.   - She was previously on B12 and folate.  - We did discuss she can stop these and we will recheck her levels to see if they are still needed.   - I did discuss with her, although she is close to our goal I would like to increase her hydroxyurea some.  - We will increase the hydroxyurea to 2 tablets on the weekends to see if this further improves her levels.  - Macrocytosis secondary to hydroxyurea.  - 9/23/2022 - Today hemoglobin and hematocrit remain at goal. Hematocrit is 43%.  - Overall, continues to tolerate hydroxyurea.   - Her BP was elevated today; however, she takes her BP at home and states it remains in the 120s/70s.  - No splenomegaly on exam.  - Her right lower extremity, which was previously having pain, has now resolved.  - 11/18/2022 - Overall her blood counts from today are at goal: hemoglobin normal at 13.4, hematocrit 41.7, platelets 242, and WBC 4.5.  - We will continue her current dose of hydrea: 1 dose M-F and 2 tablets on the weekends.   - She denies any new symptoms.  - She does have some elevated BP and she is going to keep recordings at home and review with Dr. Mckay next month, 12/2022.      2/17/23:  - Reviewed blood work from today, hemoglobin and hematocrit at  goal.   - No issues with her medication. Otherwise, she is doing well.   - We will continue her Hydrea at the current dose and extend her follow-up to 4 months.   - She will call us with any new or worsening symptoms.   - Today her BP is elevated; however, at home it is at goal below 140/90.  - She will continue to monitor at home and if it starts to rise she will notify us.   - RTC in 4 months with labs day of.     6/16/23:  - Labs from today are pending, however no new side effect to the Hydrea.  - At this point, as long as her hemoglobin is stable we will continue at current dose and follow-up in 3 months.   - RTC in 3 months with CBC, CMP and LDH.     9/8/2023:   - 3 month FUV   - Reports doing well on Hydrea 500 mg on M-F and Hydrea 1000 mg Sundays/Saturdays   - She'd like to come back after the holidays which would be in January 2024, she will call with questions or concerns in the meantime  - At least 20 minutes of direct consultation was spent with the patient today     1/5/24:  - CBC is at goal.   - At this point, we will continue Hydrea 500 mg every day Monday-Friday and 1000 mg on Saturday and Sunday.   - Given she has been doing well with no new toxicity we will spread her follow-up to 4 months.   - RTC in 4 months with blood work day of.     5/20/24:  - Overall patient is tolerating hydrea with no new toxicity.  - Hbg is at goal at 14.2.  - Hematocrit is 43.3.  - Platelets are normal.  - WBC is normal.   - At this point we will continue Hydrea 500 mg Monday-Friday and 1000 mg on Saturday and Sunday.  - No splenomegaly on exam.  - RTC in 4 months with labs.      RTC in 4 moths with labs. This note has been transcribed using a medical scribe and there is a possibility of unintentional typing misprints.       Diagnoses and all orders for this visit:  Essential thrombocythemia (Multi)  -     Clinic Appointment Request  -     TSH with reflex to Free T4 if abnormal; Future  -     Clinic Appointment Request;  Future  -     CBC and Auto Differential; Future  -     Comprehensive metabolic panel; Future  Hypothyroidism due to Hashimoto's thyroiditis  -     TSH with reflex to Free T4 if abnormal; Future  Arabella Johnson MD  Hematology/Oncology  [facility] [employer] Attestation  By signing my name below, I, Burt Leo attest that this documentation has been prepared under the direction and in the presence of Arabella Johnson MD.     Time Spent  Prep time on day of patient encounter: 5 minutes  Time spent directly with patient, family or caregiver: 15 minutes  Additional Time Spent on Patient Care Activities: 5 minutes  Documentation Time: 5 minutes  Other Time Spent: 0 minutes  Total: 30 minutes

## 2024-05-20 ENCOUNTER — LAB (OUTPATIENT)
Dept: LAB | Facility: CLINIC | Age: 79
End: 2024-05-20
Payer: MEDICARE

## 2024-05-20 ENCOUNTER — OFFICE VISIT (OUTPATIENT)
Dept: HEMATOLOGY/ONCOLOGY | Facility: CLINIC | Age: 79
End: 2024-05-20
Payer: MEDICARE

## 2024-05-20 VITALS
HEART RATE: 71 BPM | TEMPERATURE: 97.5 F | OXYGEN SATURATION: 95 % | DIASTOLIC BLOOD PRESSURE: 77 MMHG | BODY MASS INDEX: 26.08 KG/M2 | SYSTOLIC BLOOD PRESSURE: 152 MMHG | WEIGHT: 164.02 LBS | RESPIRATION RATE: 16 BRPM

## 2024-05-20 DIAGNOSIS — E06.3 HYPOTHYROIDISM DUE TO HASHIMOTO'S THYROIDITIS: ICD-10-CM

## 2024-05-20 DIAGNOSIS — D47.3 ESSENTIAL THROMBOCYTHEMIA (MULTI): ICD-10-CM

## 2024-05-20 DIAGNOSIS — E55.9 VITAMIN D DEFICIENCY: ICD-10-CM

## 2024-05-20 DIAGNOSIS — E03.8 HYPOTHYROIDISM DUE TO HASHIMOTO'S THYROIDITIS: ICD-10-CM

## 2024-05-20 DIAGNOSIS — E03.9 HYPOTHYROIDISM, UNSPECIFIED TYPE: ICD-10-CM

## 2024-05-20 LAB
25(OH)D3 SERPL-MCNC: 64 NG/ML (ref 30–100)
ALBUMIN SERPL BCP-MCNC: 4.2 G/DL (ref 3.4–5)
ALP SERPL-CCNC: 59 U/L (ref 33–136)
ALT SERPL W P-5'-P-CCNC: 12 U/L (ref 7–45)
ANION GAP SERPL CALC-SCNC: 12 MMOL/L (ref 10–20)
AST SERPL W P-5'-P-CCNC: 14 U/L (ref 9–39)
BASOPHILS # BLD AUTO: 0.04 X10*3/UL (ref 0–0.1)
BASOPHILS NFR BLD AUTO: 0.6 %
BILIRUB SERPL-MCNC: 0.8 MG/DL (ref 0–1.2)
BUN SERPL-MCNC: 23 MG/DL (ref 6–23)
CALCIUM SERPL-MCNC: 9.8 MG/DL (ref 8.6–10.3)
CHLORIDE SERPL-SCNC: 105 MMOL/L (ref 98–107)
CHOLEST SERPL-MCNC: 180 MG/DL (ref 0–199)
CHOLESTEROL/HDL RATIO: 2.3
CO2 SERPL-SCNC: 28 MMOL/L (ref 21–32)
CREAT SERPL-MCNC: 1.07 MG/DL (ref 0.5–1.05)
EGFRCR SERPLBLD CKD-EPI 2021: 54 ML/MIN/1.73M*2
EOSINOPHIL # BLD AUTO: 0.12 X10*3/UL (ref 0–0.4)
EOSINOPHIL NFR BLD AUTO: 1.9 %
ERYTHROCYTE [DISTWIDTH] IN BLOOD BY AUTOMATED COUNT: 13.1 % (ref 11.5–14.5)
GLUCOSE SERPL-MCNC: 101 MG/DL (ref 74–99)
HCT VFR BLD AUTO: 43.9 % (ref 36–46)
HDLC SERPL-MCNC: 79.4 MG/DL
HGB BLD-MCNC: 14.2 G/DL (ref 12–16)
IMM GRANULOCYTES # BLD AUTO: 0.01 X10*3/UL (ref 0–0.5)
IMM GRANULOCYTES NFR BLD AUTO: 0.2 % (ref 0–0.9)
LDLC SERPL CALC-MCNC: 85 MG/DL
LYMPHOCYTES # BLD AUTO: 1.48 X10*3/UL (ref 0.8–3)
LYMPHOCYTES NFR BLD AUTO: 23.4 %
MCH RBC QN AUTO: 37.9 PG (ref 26–34)
MCHC RBC AUTO-ENTMCNC: 32.3 G/DL (ref 32–36)
MCV RBC AUTO: 117 FL (ref 80–100)
MONOCYTES # BLD AUTO: 0.29 X10*3/UL (ref 0.05–0.8)
MONOCYTES NFR BLD AUTO: 4.6 %
NEUTROPHILS # BLD AUTO: 4.38 X10*3/UL (ref 1.6–5.5)
NEUTROPHILS NFR BLD AUTO: 69.3 %
NON HDL CHOLESTEROL: 101 MG/DL (ref 0–149)
NRBC BLD-RTO: ABNORMAL /100{WBCS}
PLATELET # BLD AUTO: 282 X10*3/UL (ref 150–450)
POTASSIUM SERPL-SCNC: 4.4 MMOL/L (ref 3.5–5.3)
PROT SERPL-MCNC: 7 G/DL (ref 6.4–8.2)
RBC # BLD AUTO: 3.75 X10*6/UL (ref 4–5.2)
RBC MORPH BLD: NORMAL
SODIUM SERPL-SCNC: 141 MMOL/L (ref 136–145)
TRIGL SERPL-MCNC: 76 MG/DL (ref 0–149)
TSH SERPL-ACNC: 3.38 MIU/L (ref 0.44–3.98)
VLDL: 15 MG/DL (ref 0–40)
WBC # BLD AUTO: 6.3 X10*3/UL (ref 4.4–11.3)

## 2024-05-20 PROCEDURE — 84075 ASSAY ALKALINE PHOSPHATASE: CPT

## 2024-05-20 PROCEDURE — 36415 COLL VENOUS BLD VENIPUNCTURE: CPT

## 2024-05-20 PROCEDURE — G2211 COMPLEX E/M VISIT ADD ON: HCPCS | Performed by: INTERNAL MEDICINE

## 2024-05-20 PROCEDURE — 1160F RVW MEDS BY RX/DR IN RCRD: CPT | Performed by: INTERNAL MEDICINE

## 2024-05-20 PROCEDURE — 99214 OFFICE O/P EST MOD 30 MIN: CPT | Performed by: INTERNAL MEDICINE

## 2024-05-20 PROCEDURE — 1123F ACP DISCUSS/DSCN MKR DOCD: CPT | Performed by: INTERNAL MEDICINE

## 2024-05-20 PROCEDURE — 85025 COMPLETE CBC W/AUTO DIFF WBC: CPT

## 2024-05-20 PROCEDURE — 84443 ASSAY THYROID STIM HORMONE: CPT

## 2024-05-20 PROCEDURE — 1126F AMNT PAIN NOTED NONE PRSNT: CPT | Performed by: INTERNAL MEDICINE

## 2024-05-20 PROCEDURE — 85060 BLOOD SMEAR INTERPRETATION: CPT | Performed by: PATHOLOGY

## 2024-05-20 PROCEDURE — 1159F MED LIST DOCD IN RCRD: CPT | Performed by: INTERNAL MEDICINE

## 2024-05-20 PROCEDURE — 82306 VITAMIN D 25 HYDROXY: CPT

## 2024-05-20 PROCEDURE — 84478 ASSAY OF TRIGLYCERIDES: CPT | Mod: 91

## 2024-05-20 ASSESSMENT — PAIN SCALES - GENERAL: PAINLEVEL: 0-NO PAIN

## 2024-05-21 LAB — PATH REVIEW-CBC DIFFERENTIAL: NORMAL

## 2024-09-20 ENCOUNTER — APPOINTMENT (OUTPATIENT)
Dept: HEMATOLOGY/ONCOLOGY | Facility: CLINIC | Age: 79
End: 2024-09-20
Payer: MEDICARE

## 2024-09-28 NOTE — PROGRESS NOTES
Patient ID: Fernanda Sotelo is a 79 y.o. female.    Subjective    HPI  Ms. Sotelo is a 80 y/o F who is presenting for follow-up for polycythemia vera.      NGS MPN revealed a JAK2 mutation, which along with her thrombocytosis, is consistent with essential thrombocytopenia. Patient presents today with questions about essential thrombocythemia.      Interval Events  11/18/2022: Most recent blood work from today, 11/18/2022, shows a hemoglobin of 13.4, hematocrit of 41.7, and her platelets are normal. Today, she reports feeling well. Denies any new side effects  or rashes. She has a good appetite. No feelings of getting full more easily. She does not feel any better or any worse since starting treatment. Patient BP has been slightly elevated. Her BP this morning at home was 138/81. She checks this every couple  of days and this is an average reading for her.      2/17/2023: Most recent blood work from the end of 1/2023 shows that her counts were at goal with hematocrit at 41 and hemoglobin 13.7. Labs from today, 2/17/2023, reveal a WBC of 4.7, hemoglobin 13.5, and hematocrit 40.8, with normal platelets of [id number].  Denies SOB, fatigue, edema, mouth sores, new rashes, and flushing. Of note, she was seen in the ER 2 weeks ago for vertigo. She had woken up to the room spinning and she was unable to stand up. This had never occurred before. Patients BP this morning  at home was approximately 135/81. She takes her BP every other day and it stays around this number.      6/16/2023: Patient presents for follow-up for polycythemia vera. Blood work from today is pending.      Since last visit, patient reports an itchy back. No new ulcers or rashes. Denies any new cough, chest pain or SOB.      9/8/2023: 3 month follow up. Reports doing very well.   Denies n/v/d/c, falls, chest pain, SOB, melena, hematochezia, fevers, chills or skin changes.      1/5/2024: Patient presents for follow-up for polycythemia vera.     Hemoglobin  today is 14. Normal platelets and WBC.      Since last visit, patient reports that overall she has been well. Denies any mouth issues. No new pain. Denies any new chest pain, cough or shortness of breath. No new rashes. No complaints today.      5/20/24: Patient presents for follow-up for polycythemia vera.      Blood work from today is still pending.      Since last visit patient reports that she is feeling well. She recently sold her home and bought a condo and looks forward to this change. She does have some reflux that is not new to her and resolves when she takes a couple of Tums. Her PCP recently took her off of her Fosamax and she wonders how her reflux will do. No itchy or rash on skin. She states that once in a while she puts some cream on her back for a slight itch but attributes this to dry skin and no other issue. No feelings of being full or new bloating. No other major complaints at this time.     9/30/24: Patient presents for polycythemia vera. Most recent blood work from today shows hemoglobin 14.5.     Patient denies any side effects from pills. No rashes, new bumps or lumps. She denies any pain.      Patient's past medical history, surgical history, family history and social history reviewed.    Review of Systems:   Review of Systems:    Positive per HPI, otherwise negative.     Objective      There were no vitals taken for this visit.     Physical Exam  Gen: appears well in clinic, NAD  HEENT: atraumatic head, normocephalic, EOMI, conjunctiva normal  LUNG: no increased WOB, CTAB  CV: No JVD. RRR  GI: soft, NT, ND  LE: no LE edema  Skin: no obvious rashes or lesions on visible skin  Neuro: interactive, no focal deficits noted  Psych: normal mood and affect  Performance Status:  Symptomatic; fully ambulatory    Labs/Imaging/Pathology: personally reviewed reports and images in Epic electronic medical record system. Pertinent results as it related to the plan represented in below in assessment and  plan.   Assessment/Plan   1.Polycythemia vera with Stepan 2 mutation  2.Thrombocytosis  - She has had elevated hematocrit since at least 2019 and since May 17121 has had an elevated platelet count.  - NGS MPN 7/7/2022 with JAK2 mutation. With this mutation and thrombocytopenia, she meets criteria for essential thrombocytopenia.  - Discussed diagnosis and overall good prognosis. Discussed treatment would be with hydroxyurea to prevent complications such as clots and stroke.  - 7/28/2022 - Discussed recommendations for treatment with hydroxyurea given her age over 60, however she is reluctant as her  had chemo. Discussed that this would be different but she is scared to start.   - Will start Aspirin 81 daily in combination with regular phlebotomies.    - She may need phlebotomies every month to every few months.  - 8/26/2022 - Hematocrit 45.7.  - Overall tolerating hydroxyurea fine.   - She was previously on B12 and folate.  - We did discuss she can stop these and we will recheck her levels to see if they are still needed.   - I did discuss with her, although she is close to our goal I would like to increase her hydroxyurea some.  - We will increase the hydroxyurea to 2 tablets on the weekends to see if this further improves her levels.  - Macrocytosis secondary to hydroxyurea.  - 9/23/2022 - Today hemoglobin and hematocrit remain at goal. Hematocrit is 43%.  - Overall, continues to tolerate hydroxyurea.   - Her BP was elevated today; however, she takes her BP at home and states it remains in the 120s/70s.  - No splenomegaly on exam.  - Her right lower extremity, which was previously having pain, has now resolved.  - 11/18/2022 - Overall her blood counts from today are at goal: hemoglobin normal at 13.4, hematocrit 41.7, platelets 242, and WBC 4.5.  - We will continue her current dose of hydrea: 1 dose M-F and 2 tablets on the weekends.   - She denies any new symptoms.  - She does have some elevated BP and she is  going to keep recordings at home and review with Dr. Mckay next month, 12/2022.      2/17/23:  - Reviewed blood work from today, hemoglobin and hematocrit at goal.   - No issues with her medication. Otherwise, she is doing well.   - We will continue her Hydrea at the current dose and extend her follow-up to 4 months.   - She will call us with any new or worsening symptoms.   - Today her BP is elevated; however, at home it is at goal below 140/90.  - She will continue to monitor at home and if it starts to rise she will notify us.   - RTC in 4 months with labs day of.     6/16/23:  - Labs from today are pending, however no new side effect to the Hydrea.  - At this point, as long as her hemoglobin is stable we will continue at current dose and follow-up in 3 months.   - RTC in 3 months with CBC, CMP and LDH.     9/8/2023:   - 3 month FUV   - Reports doing well on Hydrea 500 mg on M-F and Hydrea 1000 mg Sundays/Saturdays   - She'd like to come back after the holidays which would be in January 2024, she will call with questions or concerns in the meantime  - At least 20 minutes of direct consultation was spent with the patient today     1/5/24:  - CBC is at goal.   - At this point, we will continue Hydrea 500 mg every day Monday-Friday and 1000 mg on Saturday and Sunday.   - Given she has been doing well with no new toxicity we will spread her follow-up to 4 months.   - RTC in 4 months with blood work day of.      5/20/24:  - Overall patient is tolerating hydrea with no new toxicity.  - Hbg is at goal at 14.2.  - Hematocrit is 43.3.  - Platelets are normal.  - WBC is normal.   - At this point we will continue Hydrea 500 mg Monday-Friday and 1000 mg on Saturday and Sunday.  - No splenomegaly on exam.  - RTC in 4 months with labs.       9/30/24:  - hemoglobin at goal   - we discussed blood pressure is elevated, but nit at home   - no new toxicities from hydrea  - will continue intensive monitoring for hydrea side effects     - She sees her PCP later in the month  - RTC in four months with repeat labs     Reviewed ongoing medical problems and how they relate to her polycythemia vera, will continue long term monitoring.    RTC in four months with repeat labs . This note has been transcribed using a medical scribe and there is a possibility of unintentional typing misprints.     Diagnoses and all orders for this visit:  Essential thrombocythemia (Multi)  -     Clinic Appointment Request  -     Clinic Appointment Request; Future  -     CBC and Auto Differential; Future  -     Comprehensive metabolic panel; Future    Arabella Johnson MD  Hematology/Oncology  [facility]      Scribe Attestation  By signing my name below, INancy Caitie Lopez Scrkenton attest that this documentation has been prepared under the direction and in the presence of Arabella Johnson MD.  Time Spent  Prep time on day of patient encounter: 5 minutes  Time spent directly with patient, family or caregiver: 15 minutes  Additional Time Spent on Patient Care Activities: 5 minutes  Documentation Time: 5 minutes  Other Time Spent: 0 minutes  Total: 30 minutes

## 2024-09-30 ENCOUNTER — LAB (OUTPATIENT)
Dept: LAB | Facility: CLINIC | Age: 79
End: 2024-09-30
Payer: MEDICARE

## 2024-09-30 ENCOUNTER — OFFICE VISIT (OUTPATIENT)
Dept: HEMATOLOGY/ONCOLOGY | Facility: CLINIC | Age: 79
End: 2024-09-30
Payer: MEDICARE

## 2024-09-30 VITALS
HEART RATE: 71 BPM | BODY MASS INDEX: 26.22 KG/M2 | OXYGEN SATURATION: 97 % | RESPIRATION RATE: 16 BRPM | TEMPERATURE: 97.3 F | SYSTOLIC BLOOD PRESSURE: 170 MMHG | WEIGHT: 164.9 LBS | DIASTOLIC BLOOD PRESSURE: 73 MMHG

## 2024-09-30 DIAGNOSIS — D47.3 ESSENTIAL THROMBOCYTHEMIA (MULTI): ICD-10-CM

## 2024-09-30 DIAGNOSIS — E06.3 HYPOTHYROIDISM DUE TO HASHIMOTO'S THYROIDITIS: ICD-10-CM

## 2024-09-30 LAB
ALBUMIN SERPL BCP-MCNC: 4.1 G/DL (ref 3.4–5)
ALP SERPL-CCNC: 56 U/L (ref 33–136)
ALT SERPL W P-5'-P-CCNC: 13 U/L (ref 7–45)
ANION GAP SERPL CALC-SCNC: 11 MMOL/L (ref 10–20)
AST SERPL W P-5'-P-CCNC: 13 U/L (ref 9–39)
BASOPHILS # BLD AUTO: 0.04 X10*3/UL (ref 0–0.1)
BASOPHILS NFR BLD AUTO: 0.7 %
BILIRUB SERPL-MCNC: 0.7 MG/DL (ref 0–1.2)
BUN SERPL-MCNC: 17 MG/DL (ref 6–23)
CALCIUM SERPL-MCNC: 9.4 MG/DL (ref 8.6–10.3)
CHLORIDE SERPL-SCNC: 105 MMOL/L (ref 98–107)
CO2 SERPL-SCNC: 29 MMOL/L (ref 21–32)
CREAT SERPL-MCNC: 1.04 MG/DL (ref 0.5–1.05)
EGFRCR SERPLBLD CKD-EPI 2021: 55 ML/MIN/1.73M*2
EOSINOPHIL # BLD AUTO: 0.1 X10*3/UL (ref 0–0.4)
EOSINOPHIL NFR BLD AUTO: 1.7 %
ERYTHROCYTE [DISTWIDTH] IN BLOOD BY AUTOMATED COUNT: 14 % (ref 11.5–14.5)
GLUCOSE SERPL-MCNC: 101 MG/DL (ref 74–99)
HCT VFR BLD AUTO: 44.4 % (ref 36–46)
HGB BLD-MCNC: 14.5 G/DL (ref 12–16)
IMM GRANULOCYTES # BLD AUTO: 0.01 X10*3/UL (ref 0–0.5)
IMM GRANULOCYTES NFR BLD AUTO: 0.2 % (ref 0–0.9)
LYMPHOCYTES # BLD AUTO: 2.2 X10*3/UL (ref 0.8–3)
LYMPHOCYTES NFR BLD AUTO: 36.9 %
MCH RBC QN AUTO: 37.3 PG (ref 26–34)
MCHC RBC AUTO-ENTMCNC: 32.7 G/DL (ref 32–36)
MCV RBC AUTO: 114 FL (ref 80–100)
MONOCYTES # BLD AUTO: 0.36 X10*3/UL (ref 0.05–0.8)
MONOCYTES NFR BLD AUTO: 6 %
NEUTROPHILS # BLD AUTO: 3.25 X10*3/UL (ref 1.6–5.5)
NEUTROPHILS NFR BLD AUTO: 54.5 %
NRBC BLD-RTO: ABNORMAL /100{WBCS}
PATH REVIEW-CBC DIFFERENTIAL: NORMAL
PLATELET # BLD AUTO: 294 X10*3/UL (ref 150–450)
POTASSIUM SERPL-SCNC: 4 MMOL/L (ref 3.5–5.3)
PROT SERPL-MCNC: 6.9 G/DL (ref 6.4–8.2)
RBC # BLD AUTO: 3.89 X10*6/UL (ref 4–5.2)
RBC MORPH BLD: NORMAL
SODIUM SERPL-SCNC: 141 MMOL/L (ref 136–145)
T4 FREE SERPL-MCNC: 1.14 NG/DL (ref 0.61–1.12)
TSH SERPL-ACNC: 5.38 MIU/L (ref 0.44–3.98)
WBC # BLD AUTO: 6 X10*3/UL (ref 4.4–11.3)

## 2024-09-30 PROCEDURE — 36415 COLL VENOUS BLD VENIPUNCTURE: CPT

## 2024-09-30 PROCEDURE — 80053 COMPREHEN METABOLIC PANEL: CPT

## 2024-09-30 PROCEDURE — 99214 OFFICE O/P EST MOD 30 MIN: CPT | Performed by: INTERNAL MEDICINE

## 2024-09-30 PROCEDURE — 1123F ACP DISCUSS/DSCN MKR DOCD: CPT | Performed by: INTERNAL MEDICINE

## 2024-09-30 PROCEDURE — 84443 ASSAY THYROID STIM HORMONE: CPT

## 2024-09-30 PROCEDURE — 1159F MED LIST DOCD IN RCRD: CPT | Performed by: INTERNAL MEDICINE

## 2024-09-30 PROCEDURE — 84439 ASSAY OF FREE THYROXINE: CPT

## 2024-09-30 PROCEDURE — 1126F AMNT PAIN NOTED NONE PRSNT: CPT | Performed by: INTERNAL MEDICINE

## 2024-09-30 PROCEDURE — 85025 COMPLETE CBC W/AUTO DIFF WBC: CPT

## 2024-09-30 PROCEDURE — G2211 COMPLEX E/M VISIT ADD ON: HCPCS | Performed by: INTERNAL MEDICINE

## 2024-09-30 ASSESSMENT — PAIN SCALES - GENERAL: PAINLEVEL: 0-NO PAIN

## 2024-10-22 DIAGNOSIS — D47.3 ESSENTIAL THROMBOCYTHEMIA (MULTI): ICD-10-CM

## 2024-10-22 RX ORDER — HYDROXYUREA 500 MG/1
CAPSULE ORAL
Qty: 115 CAPSULE | Refills: 1 | Status: SHIPPED | OUTPATIENT
Start: 2024-10-22

## 2024-10-23 ENCOUNTER — APPOINTMENT (OUTPATIENT)
Dept: PRIMARY CARE | Facility: CLINIC | Age: 79
End: 2024-10-23
Payer: MEDICARE

## 2024-10-30 ENCOUNTER — APPOINTMENT (OUTPATIENT)
Dept: PRIMARY CARE | Facility: CLINIC | Age: 79
End: 2024-10-30
Payer: MEDICARE

## 2025-01-07 ENCOUNTER — APPOINTMENT (OUTPATIENT)
Dept: PRIMARY CARE | Facility: CLINIC | Age: 80
End: 2025-01-07
Payer: MEDICARE

## 2025-01-28 ENCOUNTER — APPOINTMENT (OUTPATIENT)
Dept: PRIMARY CARE | Facility: CLINIC | Age: 80
End: 2025-01-28
Payer: MEDICARE

## 2025-01-28 VITALS
WEIGHT: 169 LBS | TEMPERATURE: 97.9 F | BODY MASS INDEX: 26.53 KG/M2 | HEIGHT: 67 IN | SYSTOLIC BLOOD PRESSURE: 146 MMHG | RESPIRATION RATE: 16 BRPM | HEART RATE: 76 BPM | DIASTOLIC BLOOD PRESSURE: 84 MMHG | OXYGEN SATURATION: 99 %

## 2025-01-28 DIAGNOSIS — I10 PRIMARY HYPERTENSION: ICD-10-CM

## 2025-01-28 DIAGNOSIS — E03.9 HYPOTHYROIDISM, UNSPECIFIED TYPE: Primary | ICD-10-CM

## 2025-01-28 DIAGNOSIS — N18.31 CKD STAGE 3A, GFR 45-59 ML/MIN (MULTI): ICD-10-CM

## 2025-01-28 PROCEDURE — 1123F ACP DISCUSS/DSCN MKR DOCD: CPT

## 2025-01-28 PROCEDURE — 1159F MED LIST DOCD IN RCRD: CPT

## 2025-01-28 PROCEDURE — 1036F TOBACCO NON-USER: CPT

## 2025-01-28 PROCEDURE — 99214 OFFICE O/P EST MOD 30 MIN: CPT

## 2025-01-28 PROCEDURE — 3079F DIAST BP 80-89 MM HG: CPT

## 2025-01-28 PROCEDURE — G2211 COMPLEX E/M VISIT ADD ON: HCPCS

## 2025-01-28 PROCEDURE — 3077F SYST BP >= 140 MM HG: CPT

## 2025-01-28 RX ORDER — AMLODIPINE BESYLATE 2.5 MG/1
2.5 TABLET ORAL DAILY
Qty: 30 TABLET | Refills: 0 | Status: SHIPPED | OUTPATIENT
Start: 2025-01-28 | End: 2025-02-27

## 2025-01-28 ASSESSMENT — PATIENT HEALTH QUESTIONNAIRE - PHQ9
2. FEELING DOWN, DEPRESSED OR HOPELESS: NOT AT ALL
SUM OF ALL RESPONSES TO PHQ9 QUESTIONS 1 AND 2: 0
1. LITTLE INTEREST OR PLEASURE IN DOING THINGS: NOT AT ALL

## 2025-01-28 NOTE — PROGRESS NOTES
"Subjective   Patient ID: Fernanda Sotelo is a 79 y.o. female who presents for Follow-up (NT patient. Here for 6 month follow up.  Concerned with BP. Seems to read high when she takes it at home. Today at home 139/84).    Follow up regarding blood pressure  States that when she goes to hematologist BP is frequently high  Has BP wrist monitor at home, will check pressures daily however will get variable readings  Does not feel any symptoms when blood pressure is elevated: denies any SOB, CP, headache, visual changes  Does not add extra salt to foods  Coffee daily: 1 cup in the morning  Non-smoker, drinks 1-2 glasses of wine several times per week.         Review of Systems    Objective   /84 (BP Location: Right arm, Patient Position: Sitting)   Pulse 76   Temp 36.6 °C (97.9 °F)   Resp 16   Ht 1.689 m (5' 6.5\")   Wt 76.7 kg (169 lb)   SpO2 99%   BMI 26.87 kg/m²     Physical Exam  Constitutional:       General: She is not in acute distress.     Appearance: Normal appearance. She is not ill-appearing.   Eyes:      General: No scleral icterus.  Cardiovascular:      Rate and Rhythm: Normal rate and regular rhythm.      Heart sounds: No murmur heard.     No friction rub. No gallop.   Pulmonary:      Effort: Pulmonary effort is normal. No respiratory distress.      Breath sounds: Normal breath sounds. No wheezing, rhonchi or rales.   Musculoskeletal:      Right lower leg: No edema.      Left lower leg: No edema.   Neurological:      General: No focal deficit present.      Mental Status: She is alert and oriented to person, place, and time.   Psychiatric:         Mood and Affect: Mood normal.         Behavior: Behavior normal.         Assessment/Plan   Problem List Items Addressed This Visit             ICD-10-CM    Hypothyroidism - Primary E03.9    Relevant Orders    Tsh With Reflex To Free T4 If Abnormal    Primary hypertension I10     -Multiple healthcare setting with elevated pressures  -Monitoring at home " with wrist BP monitor, has cuff at home, encouraged to use  -Start amlodipine 2.5 mg, discussed R/B/SE.  Patient states understand, contact office with any side effects.  -Follow-up 1 month for blood pressure reevaluation             Relevant Medications    amLODIPine (Norvasc) 2.5 mg tablet    Other Relevant Orders    Follow Up In Primary Care - Established    CKD stage 3a, GFR 45-59 ml/min (Multi) N18.31     Multiple readings with EGFR less than 60  Will reevaluate with BMP today  Consider urine albumin: Creatinine ratio         Relevant Orders    Basic metabolic panel     Baltazar Grullon,

## 2025-01-28 NOTE — ASSESSMENT & PLAN NOTE
Multiple readings with EGFR less than 60  Will reevaluate with BMP today  Consider urine albumin: Creatinine ratio

## 2025-01-28 NOTE — ASSESSMENT & PLAN NOTE
-Multiple healthcare setting with elevated pressures  -Monitoring at home with wrist BP monitor, has cuff at home, encouraged to use  -Start amlodipine 2.5 mg, discussed R/B/SE.  Patient states understand, contact office with any side effects.  -Follow-up 1 month for blood pressure reevaluation

## 2025-01-30 ENCOUNTER — LAB (OUTPATIENT)
Dept: LAB | Facility: CLINIC | Age: 80
End: 2025-01-30
Payer: MEDICARE

## 2025-01-30 ENCOUNTER — OFFICE VISIT (OUTPATIENT)
Dept: HEMATOLOGY/ONCOLOGY | Facility: CLINIC | Age: 80
End: 2025-01-30
Payer: MEDICARE

## 2025-01-30 VITALS
TEMPERATURE: 97.3 F | OXYGEN SATURATION: 98 % | HEART RATE: 74 BPM | SYSTOLIC BLOOD PRESSURE: 172 MMHG | DIASTOLIC BLOOD PRESSURE: 81 MMHG | BODY MASS INDEX: 26.33 KG/M2 | RESPIRATION RATE: 17 BRPM | HEIGHT: 67 IN | WEIGHT: 167.77 LBS

## 2025-01-30 DIAGNOSIS — D47.3 ESSENTIAL THROMBOCYTHEMIA (MULTI): ICD-10-CM

## 2025-01-30 LAB
ALBUMIN SERPL BCP-MCNC: 4.2 G/DL (ref 3.4–5)
ALP SERPL-CCNC: 60 U/L (ref 33–136)
ALT SERPL W P-5'-P-CCNC: 12 U/L (ref 7–45)
ANION GAP SERPL CALC-SCNC: 13 MMOL/L (ref 10–20)
AST SERPL W P-5'-P-CCNC: 14 U/L (ref 9–39)
BASOPHILS # BLD AUTO: 0.02 X10*3/UL (ref 0–0.1)
BASOPHILS NFR BLD AUTO: 0.4 %
BILIRUB SERPL-MCNC: 0.6 MG/DL (ref 0–1.2)
BUN SERPL-MCNC: 18 MG/DL (ref 6–23)
CALCIUM SERPL-MCNC: 9.3 MG/DL (ref 8.6–10.3)
CHLORIDE SERPL-SCNC: 104 MMOL/L (ref 98–107)
CO2 SERPL-SCNC: 28 MMOL/L (ref 21–32)
CREAT SERPL-MCNC: 0.91 MG/DL (ref 0.5–1.05)
EGFRCR SERPLBLD CKD-EPI 2021: 65 ML/MIN/1.73M*2
EOSINOPHIL # BLD AUTO: 0.06 X10*3/UL (ref 0–0.4)
EOSINOPHIL NFR BLD AUTO: 1.2 %
ERYTHROCYTE [DISTWIDTH] IN BLOOD BY AUTOMATED COUNT: 12.7 % (ref 11.5–14.5)
GLUCOSE SERPL-MCNC: 107 MG/DL (ref 74–99)
HCT VFR BLD AUTO: 44 % (ref 36–46)
HGB BLD-MCNC: 14.4 G/DL (ref 12–16)
IMM GRANULOCYTES # BLD AUTO: 0 X10*3/UL (ref 0–0.5)
IMM GRANULOCYTES NFR BLD AUTO: 0 % (ref 0–0.9)
LYMPHOCYTES # BLD AUTO: 1.38 X10*3/UL (ref 0.8–3)
LYMPHOCYTES NFR BLD AUTO: 27.2 %
MCH RBC QN AUTO: 38.2 PG (ref 26–34)
MCHC RBC AUTO-ENTMCNC: 32.7 G/DL (ref 32–36)
MCV RBC AUTO: 117 FL (ref 80–100)
MONOCYTES # BLD AUTO: 0.33 X10*3/UL (ref 0.05–0.8)
MONOCYTES NFR BLD AUTO: 6.5 %
NEUTROPHILS # BLD AUTO: 3.29 X10*3/UL (ref 1.6–5.5)
NEUTROPHILS NFR BLD AUTO: 64.7 %
NRBC BLD-RTO: ABNORMAL /100{WBCS}
PATH REVIEW-CBC DIFFERENTIAL: NORMAL
PLATELET # BLD AUTO: 312 X10*3/UL (ref 150–450)
POTASSIUM SERPL-SCNC: 4.1 MMOL/L (ref 3.5–5.3)
PROT SERPL-MCNC: 6.9 G/DL (ref 6.4–8.2)
RBC # BLD AUTO: 3.77 X10*6/UL (ref 4–5.2)
RBC MORPH BLD: NORMAL
SODIUM SERPL-SCNC: 141 MMOL/L (ref 136–145)
STOMATOCYTES BLD QL SMEAR: NORMAL
TSH SERPL-ACNC: 1.53 MIU/L (ref 0.44–3.98)
WBC # BLD AUTO: 5.1 X10*3/UL (ref 4.4–11.3)

## 2025-01-30 PROCEDURE — 85025 COMPLETE CBC W/AUTO DIFF WBC: CPT

## 2025-01-30 PROCEDURE — 3079F DIAST BP 80-89 MM HG: CPT | Performed by: INTERNAL MEDICINE

## 2025-01-30 PROCEDURE — 84443 ASSAY THYROID STIM HORMONE: CPT

## 2025-01-30 PROCEDURE — 99214 OFFICE O/P EST MOD 30 MIN: CPT | Performed by: INTERNAL MEDICINE

## 2025-01-30 PROCEDURE — 1123F ACP DISCUSS/DSCN MKR DOCD: CPT | Performed by: INTERNAL MEDICINE

## 2025-01-30 PROCEDURE — 3077F SYST BP >= 140 MM HG: CPT | Performed by: INTERNAL MEDICINE

## 2025-01-30 PROCEDURE — 36415 COLL VENOUS BLD VENIPUNCTURE: CPT

## 2025-01-30 PROCEDURE — 80053 COMPREHEN METABOLIC PANEL: CPT

## 2025-01-30 PROCEDURE — 1159F MED LIST DOCD IN RCRD: CPT | Performed by: INTERNAL MEDICINE

## 2025-01-30 PROCEDURE — G2211 COMPLEX E/M VISIT ADD ON: HCPCS | Performed by: INTERNAL MEDICINE

## 2025-01-30 PROCEDURE — 1126F AMNT PAIN NOTED NONE PRSNT: CPT | Performed by: INTERNAL MEDICINE

## 2025-01-30 RX ORDER — HYDROXYUREA 500 MG/1
CAPSULE ORAL
Qty: 115 CAPSULE | Refills: 6 | Status: SHIPPED | OUTPATIENT
Start: 2025-01-30

## 2025-01-30 ASSESSMENT — PAIN SCALES - GENERAL: PAINLEVEL_OUTOF10: 0-NO PAIN

## 2025-01-30 NOTE — PROGRESS NOTES
Patient ID: Fernanda Sotelo is a 79 y.o. female.    Subjective    HPI  Ms. Sotelo is a 80 y/o F who is presenting for follow-up for polycythemia vera. Most recent Hb today is stable at 14.4,  not other cytopenia or leukocytosis. She was started on low dose hypertensive medication by her PCP, no issues noted yet as she started it just yesterday, will follow up with her PCP again in February. She has intermittent hip pain which comes and goes, attributes to arthritis, has an appointment scheduled next week for evaluation. Notes decent energy and appetite. No new itching or rashes. No palpitations.     NGS MPN revealed a JAK2 mutation, which along with her thrombocytosis, is consistent with essential thrombocytopenia. Patient presents today with questions about essential thrombocythemia.      Interval Events  11/18/2022: Most recent blood work from today, 11/18/2022, shows a hemoglobin of 13.4, hematocrit of 41.7, and her platelets are normal. Today, she reports feeling well. Denies any new side effects  or rashes. She has a good appetite. No feelings of getting full more easily. She does not feel any better or any worse since starting treatment. Patient BP has been slightly elevated. Her BP this morning at home was 138/81. She checks this every couple  of days and this is an average reading for her.      2/17/2023: Most recent blood work from the end of 1/2023 shows that her counts were at goal with hematocrit at 41 and hemoglobin 13.7. Labs from today, 2/17/2023, reveal a WBC of 4.7, hemoglobin 13.5, and hematocrit 40.8, with normal platelets of [id number].  Denies SOB, fatigue, edema, mouth sores, new rashes, and flushing. Of note, she was seen in the ER 2 weeks ago for vertigo. She had woken up to the room spinning and she was unable to stand up. This had never occurred before. Patients BP this morning  at home was approximately 135/81. She takes her BP every other day and it stays around this number.      6/16/2023:  Patient presents for follow-up for polycythemia vera. Blood work from today is pending.      Since last visit, patient reports an itchy back. No new ulcers or rashes. Denies any new cough, chest pain or SOB.      9/8/2023: 3 month follow up. Reports doing very well.   Denies n/v/d/c, falls, chest pain, SOB, melena, hematochezia, fevers, chills or skin changes.      1/5/2024: Patient presents for follow-up for polycythemia vera.     Hemoglobin today is 14. Normal platelets and WBC.      Since last visit, patient reports that overall she has been well. Denies any mouth issues. No new pain. Denies any new chest pain, cough or shortness of breath. No new rashes. No complaints today.      5/20/24: Patient presents for follow-up for polycythemia vera.      Blood work from today is still pending.      Since last visit patient reports that she is feeling well. She recently sold her home and bought a condo and looks forward to this change. She does have some reflux that is not new to her and resolves when she takes a couple of Tums. Her PCP recently took her off of her Fosamax and she wonders how her reflux will do. No itchy or rash on skin. She states that once in a while she puts some cream on her back for a slight itch but attributes this to dry skin and no other issue. No feelings of being full or new bloating. No other major complaints at this time.      9/30/24: Patient presents for polycythemia vera. Most recent blood work from today shows hemoglobin 14.5.      Patient denies any side effects from pills. No rashes, new bumps or lumps. She denies any pain.     1/30/25: Patient presents for follow-up for polycythemia vera.      Patient's past medical history, surgical history, family history and social history reviewed.    Objective      There were no vitals taken for this visit.  Review of Systems:   Review of Systems:    Positive per HPI, otherwise negative.    Physical Exam  Gen: appears well in clinic, NAD  HEENT:  atraumatic head, normocephalic, EOMI, conjunctiva normal  LUNG: no increased WOB, CTAB  CV: No JVD. RRR  GI: soft, NT, ND  LE: no LE edema  Skin: no obvious rashes or lesions on visible skin  Neuro: interactive, no focal deficits noted  Psych: normal mood and affect  Performance Status:  Symptomatic; fully ambulatory    Labs/Imaging/Pathology: personally reviewed reports and images in Epic electronic medical record system. Pertinent results as it related to the plan represented in below in assessment and plan.   Assessment/Plan   1.Polycythemia vera with Stepan 2 mutation  2.Thrombocytosis  - She has had elevated hematocrit since at least 2019 and since May [id number] has had an elevated platelet count.  - NGS MPN 7/7/2022 with JAK2 mutation. With this mutation and thrombocytopenia, she meets criteria for essential thrombocytopenia.  - Discussed diagnosis and overall good prognosis. Discussed treatment would be with hydroxyurea to prevent complications such as clots and stroke.  - 7/28/2022 - Discussed recommendations for treatment with hydroxyurea given her age over 60, however she is reluctant as her  had chemo. Discussed that this would be different but she is scared to start.   - Will start Aspirin 81 daily in combination with regular phlebotomies.    - She may need phlebotomies every month to every few months.  - 8/26/2022 - Hematocrit 45.7.  - Overall tolerating hydroxyurea fine.   - She was previously on B12 and folate.  - We did discuss she can stop these and we will recheck her levels to see if they are still needed.   - I did discuss with her, although she is close to our goal I would like to increase her hydroxyurea some.  - We will increase the hydroxyurea to 2 tablets on the weekends to see if this further improves her levels.  - Macrocytosis secondary to hydroxyurea.  - 9/23/2022 - Today hemoglobin and hematocrit remain at goal. Hematocrit is 43%.  - Overall, continues to tolerate hydroxyurea.   - Her  BP was elevated today; however, she takes her BP at home and states it remains in the 120s/70s.  - No splenomegaly on exam.  - Her right lower extremity, which was previously having pain, has now resolved.  - 11/18/2022 - Overall her blood counts from today are at goal: hemoglobin normal at 13.4, hematocrit 41.7, platelets 242, and WBC 4.5.  - We will continue her current dose of hydrea: 1 dose M-F and 2 tablets on the weekends.   - She denies any new symptoms.  - She does have some elevated BP and she is going to keep recordings at home and review with Dr. Mckay next month, 12/2022.      2/17/23:  - Reviewed blood work from today, hemoglobin and hematocrit at goal.   - No issues with her medication. Otherwise, she is doing well.   - We will continue her Hydrea at the current dose and extend her follow-up to 4 months.   - She will call us with any new or worsening symptoms.   - Today her BP is elevated; however, at home it is at goal below 140/90.  - She will continue to monitor at home and if it starts to rise she will notify us.   - RTC in 4 months with labs day of.     6/16/23:  - Labs from today are pending, however no new side effect to the Hydrea.  - At this point, as long as her hemoglobin is stable we will continue at current dose and follow-up in 3 months.   - RTC in 3 months with CBC, CMP and LDH.     9/8/2023:   - 3 month FUV   - Reports doing well on Hydrea 500 mg on M-F and Hydrea 1000 mg Sundays/Saturdays   - She'd like to come back after the holidays which would be in January 2024, she will call with questions or concerns in the meantime  - At least 20 minutes of direct consultation was spent with the patient today     1/5/24:  - CBC is at goal.   - At this point, we will continue Hydrea 500 mg every day Monday-Friday and 1000 mg on Saturday and Sunday.   - Given she has been doing well with no new toxicity we will spread her follow-up to 4 months.   - RTC in 4 months with blood work day of.       5/20/24:  - Overall patient is tolerating hydrea with no new toxicity.  - Hbg is at goal at 14.2.  - Hematocrit is 43.3.  - Platelets are normal.  - WBC is normal.   - At this point we will continue Hydrea 500 mg Monday-Friday and 1000 mg on Saturday and Sunday.  - No splenomegaly on exam.  - RTC in 4 months with labs.     9/30/24:  - hemoglobin at goal   - we discussed blood pressure is elevated, but nit at home   - no new toxicities from hydrea  - will continue intensive monitoring for hydrea side effects    - She sees her PCP later in the month  - RTC in four months with repeat labs     1/20/25:  - patient has bee started on a BP medication since her last visit, we dicussed this could be related to hydrea as its associated with some increase in BP.   - She denies any other new side effects, currently on a BP medication  that started yesterday  - She is being considered for possible intervention for her right hip arthritis.    - At this point, we will continue hydrea  at current dose, 1 tab mon-fri, 2 tabs on weekends.   - RTC in 3 months with labs.  - Refill for hydrea provided       Reviewed ongoing medical problems and how they relate to her polycythemia vera, will continue long term monitoring.     RTC . This note has been transcribed using a medical scribe and there is a possibility of unintentional typing misprints.     Diagnoses and all orders for this visit:  Essential thrombocythemia (Multi)  -     Clinic Appointment Request  -     Clinic Appointment Request; Future  -     CBC and Auto Differential; Future  -     Comprehensive metabolic panel; Future  -     hydroxyurea (Hydrea) 500 mg capsule; Take 1 capsule (500 mg) by mouth once daily Monday thru Friday (5 days/week) and 2 capsule (1000 mg) by mouth once daily on Saturday and Sunday       Arabella Johnson MD  Hematology/Oncology  [facility]    Scribe Attestation  By signing my name below, IPatricia Scribe   attest that  this documentation has been prepared under the direction and in the presence of Arabella Johnson MD.     Scribe Attestation  By signing my name below, IAilyn Scribe   attest that this documentation has been prepared under the direction and in the presence of Arabella Johnson MD.    Time Spent  Prep time on day of patient encounter: 5 minutes  Time spent directly with patient, family or caregiver: 15 minutes  Additional Time Spent on Patient Care Activities: 5 minutes  Documentation Time: 5 minutes  Other Time Spent: 0 minutes  Total: 30 minutes

## 2025-02-28 ENCOUNTER — APPOINTMENT (OUTPATIENT)
Dept: PRIMARY CARE | Facility: CLINIC | Age: 80
End: 2025-02-28
Payer: MEDICARE

## 2025-02-28 VITALS
SYSTOLIC BLOOD PRESSURE: 132 MMHG | TEMPERATURE: 98.2 F | DIASTOLIC BLOOD PRESSURE: 72 MMHG | WEIGHT: 167 LBS | HEART RATE: 72 BPM | OXYGEN SATURATION: 97 % | RESPIRATION RATE: 16 BRPM | BODY MASS INDEX: 26.21 KG/M2

## 2025-02-28 DIAGNOSIS — I10 PRIMARY HYPERTENSION: ICD-10-CM

## 2025-02-28 PROCEDURE — 99213 OFFICE O/P EST LOW 20 MIN: CPT

## 2025-02-28 PROCEDURE — G2211 COMPLEX E/M VISIT ADD ON: HCPCS

## 2025-02-28 PROCEDURE — 1159F MED LIST DOCD IN RCRD: CPT

## 2025-02-28 PROCEDURE — 3075F SYST BP GE 130 - 139MM HG: CPT

## 2025-02-28 PROCEDURE — 3078F DIAST BP <80 MM HG: CPT

## 2025-02-28 PROCEDURE — 1123F ACP DISCUSS/DSCN MKR DOCD: CPT

## 2025-02-28 RX ORDER — AMLODIPINE BESYLATE 2.5 MG/1
2.5 TABLET ORAL DAILY
Qty: 90 TABLET | Refills: 1 | Status: SHIPPED | OUTPATIENT
Start: 2025-02-28 | End: 2025-08-27

## 2025-02-28 ASSESSMENT — PATIENT HEALTH QUESTIONNAIRE - PHQ9
2. FEELING DOWN, DEPRESSED OR HOPELESS: NOT AT ALL
1. LITTLE INTEREST OR PLEASURE IN DOING THINGS: NOT AT ALL
SUM OF ALL RESPONSES TO PHQ9 QUESTIONS 1 AND 2: 0

## 2025-02-28 NOTE — PROGRESS NOTES
Subjective   Patient ID: Fernanda Sotelo is a 80 y.o. female who presents for Follow-up (PCP KEVIN Mckay.  Follow-up 1 month for blood pressure reevaluation. She checked her BP this morning at home 127/71. ).    Patient here today to follow-up regarding elevated blood pressures.  Was previously started on amlodipine and has tolerated well.  Denies any adverse effects associated with this medication.  Home blood pressure readings show regular systolics in the 120s, diastolic less than 90.  Does not endorse any chest pain, shortness of breath, lower extremity swelling, headache or new visual changes         Review of Systems    Objective   /72   Pulse 72   Temp 36.8 °C (98.2 °F)   Resp 16   Wt 75.8 kg (167 lb)   SpO2 97%   BMI 26.21 kg/m²     Physical Exam  Constitutional:       General: She is not in acute distress.     Appearance: Normal appearance. She is not ill-appearing.   Eyes:      General: No scleral icterus.  Cardiovascular:      Rate and Rhythm: Normal rate and regular rhythm.      Heart sounds: No murmur heard.     No friction rub. No gallop.   Pulmonary:      Effort: Pulmonary effort is normal. No respiratory distress.      Breath sounds: Normal breath sounds. No wheezing, rhonchi or rales.   Musculoskeletal:      Right lower leg: No edema.      Left lower leg: No edema.   Neurological:      General: No focal deficit present.      Mental Status: She is alert and oriented to person, place, and time.   Psychiatric:         Mood and Affect: Mood normal.         Behavior: Behavior normal.         Assessment/Plan   Problem List Items Addressed This Visit             ICD-10-CM    Primary hypertension I10    Relevant Medications    amLODIPine (Norvasc) 2.5 mg tablet   Will refill amlodipine at this time, patient having good success with this medication and appears to be at goal per home blood pressure readings.  Recommend that she continue routine monitoring of blood pressures with home cuff  Follow-up  in 6 months or sooner for hypertension medication management.    Baltazar Grullon, DO

## 2025-04-22 ENCOUNTER — APPOINTMENT (OUTPATIENT)
Dept: PRIMARY CARE | Facility: CLINIC | Age: 80
End: 2025-04-22
Payer: MEDICARE

## 2025-04-25 NOTE — PROGRESS NOTES
Patient ID: Fernanda Sotelo is a 80 y.o. female.    Subjective    HPI  Ms. Sotelo is 80 y.o. female  who is presenting for follow-up for polycythemia vera. Most recent Hb today is stable at 14.4,  not other cytopenia or leukocytosis. She was started on low dose hypertensive medication by her PCP, no issues noted yet as she started it just yesterday, will follow up with her PCP again in February. She has intermittent hip pain which comes and goes, attributes to arthritis, has an appointment scheduled next week for evaluation. Notes decent energy and appetite. No new itching or rashes. No palpitations.     NGS MPN revealed a JAK2 mutation, which along with her thrombocytosis, is consistent with essential thrombocytopenia. Patient presents today with questions about essential thrombocythemia.      Interval Events  11/18/2022:   Most recent blood work from today, 11/18/2022, shows a hemoglobin of 13.4, hematocrit of 41.7, and her platelets are normal. Today, she reports feeling well. Denies any new side effects  or rashes. She has a good appetite. No feelings of getting full more easily. She does not feel any better or any worse since starting treatment. Patient BP has been slightly elevated. Her BP this morning at home was 138/81. She checks this every couple  of days and this is an average reading for her.      2/17/2023:   Most recent blood work from the end of 1/2023 shows that her counts were at goal with hematocrit at 41 and hemoglobin 13.7. Labs from today, 2/17/2023, reveal a WBC of 4.7, hemoglobin 13.5, and hematocrit 40.8, with normal platelets of [id number].  Denies SOB, fatigue, edema, mouth sores, new rashes, and flushing. Of note, she was seen in the ER 2 weeks ago for vertigo. She had woken up to the room spinning and she was unable to stand up. This had never occurred before. Patients BP this morning  at home was approximately 135/81. She takes her BP every other day and it stays around this number.       6/16/2023:   Patient presents for follow-up for polycythemia vera. Blood work from today is pending.    Since last visit, patient reports an itchy back. No new ulcers or rashes. Denies any new cough, chest pain or SOB.      9/8/2023:   3 month follow up. Reports doing very well.   Denies n/v/d/c, falls, chest pain, SOB, melena, hematochezia, fevers, chills or skin changes.      1/5/2024:   Patient presents for follow-up for polycythemia vera.  Hemoglobin today is 14. Normal platelets and WBC.   Since last visit, patient reports that overall she has been well. Denies any mouth issues. No new pain. Denies any new chest pain, cough or shortness of breath. No new rashes. No complaints today.      5/20/24:   Patient presents for follow-up for polycythemia vera.   Blood work from today is still pending.   Since last visit patient reports that she is feeling well. She recently sold her home and bought a condo and looks forward to this change. She does have some reflux that is not new to her and resolves when she takes a couple of Tums. Her PCP recently took her off of her Fosamax and she wonders how her reflux will do. No itchy or rash on skin. She states that once in a while she puts some cream on her back for a slight itch but attributes this to dry skin and no other issue. No feelings of being full or new bloating. No other major complaints at this time.      9/30/24:   Patient presents for polycythemia vera. Most recent blood work from today shows hemoglobin 14.5.   Patient denies any side effects from pills. No rashes, new bumps or lumps. She denies any pain.     1/30/25:   Patient presents for follow-up for polycythemia vera.  Patient's past medical history, surgical history, family history and social history reviewed.    4/29/25:  Patient presents for follow-up for polycythemia vera. Blood work from today WBC, platelets and hemoglobin all at goal.   She has been doing well with good energy and appetite. No new  rashes, palpitations, new swelling.     Patient's past medical history, surgical history, family history and social history reviewed.    Review of Systems:   Review of Systems:    Positive per HPI, otherwise negative.       Objective    /78 (BP Location: Right arm, Patient Position: Sitting)   Pulse 73   Temp 36.5 °C (97.7 °F) (Temporal)   Resp 16   Wt 76.7 kg (169 lb 1.5 oz)   SpO2 97%   BMI 26.54 kg/m²       Physical Exam  Gen: appears well in clinic, NAD  HEENT: atraumatic head, normocephalic, EOMI, conjunctiva normal  LUNG: no increased WOB, CTAB  CV: No JVD. RRR  GI: soft, NT, ND  LE: no LE edema  Skin: no obvious rashes or lesions on visible skin  Neuro: interactive, no focal deficits noted  Psych: normal mood and affect    Performance Status:  Symptomatic; fully ambulatory    Labs/Imaging/Pathology: personally reviewed reports and images in Epic electronic medical record system. Pertinent results as it related to the plan represented in below in assessment and plan.     Assessment/Plan   1.Polycythemia vera with Stepan 2 mutation  2.Thrombocytosis  - She has had elevated hematocrit since at least 2019 and since May [id number] has had an elevated platelet count.  - NGS MPN 7/7/2022 with JAK2 mutation. With this mutation and thrombocytopenia, she meets criteria for essential thrombocytopenia.  - Discussed diagnosis and overall good prognosis. Discussed treatment would be with hydroxyurea to prevent complications such as clots and stroke.  - 7/28/2022 - Discussed recommendations for treatment with hydroxyurea given her age over 60, however she is reluctant as her  had chemo. Discussed that this would be different but she is scared to start.   - Will start Aspirin 81 daily in combination with regular phlebotomies.    - She may need phlebotomies every month to every few months.  - 8/26/2022 - Hematocrit 45.7.  - Overall tolerating hydroxyurea fine.   - She was previously on B12 and folate.  - We did  discuss she can stop these and we will recheck her levels to see if they are still needed.   - I did discuss with her, although she is close to our goal I would like to increase her hydroxyurea some.  - We will increase the hydroxyurea to 2 tablets on the weekends to see if this further improves her levels.  - Macrocytosis secondary to hydroxyurea.  - 9/23/2022 - Today hemoglobin and hematocrit remain at goal. Hematocrit is 43%.  - Overall, continues to tolerate hydroxyurea.   - Her BP was elevated today; however, she takes her BP at home and states it remains in the 120s/70s.  - No splenomegaly on exam.  - Her right lower extremity, which was previously having pain, has now resolved.  - 11/18/2022 - Overall her blood counts from today are at goal: hemoglobin normal at 13.4, hematocrit 41.7, platelets 242, and WBC 4.5.  - We will continue her current dose of hydrea: 1 dose M-F and 2 tablets on the weekends.   - She denies any new symptoms.  - She does have some elevated BP and she is going to keep recordings at home and review with Dr. Mckay next month, 12/2022.      2/17/23:  - Reviewed blood work from today, hemoglobin and hematocrit at goal.   - No issues with her medication. Otherwise, she is doing well.   - We will continue her Hydrea at the current dose and extend her follow-up to 4 months.   - She will call us with any new or worsening symptoms.   - Today her BP is elevated; however, at home it is at goal below 140/90.  - She will continue to monitor at home and if it starts to rise she will notify us.   - RTC in 4 months with labs day of.     6/16/23:  - Labs from today are pending, however no new side effect to the Hydrea.  - At this point, as long as her hemoglobin is stable we will continue at current dose and follow-up in 3 months.   - RTC in 3 months with CBC, CMP and LDH.     9/8/2023:   - 3 month FUV   - Reports doing well on Hydrea 500 mg on M-F and Hydrea 1000 mg Sundays/Saturdays   - She'd like to  come back after the holidays which would be in January 2024, she will call with questions or concerns in the meantime  - At least 20 minutes of direct consultation was spent with the patient today     1/5/24:  - CBC is at goal.   - At this point, we will continue Hydrea 500 mg every day Monday-Friday and 1000 mg on Saturday and Sunday.   - Given she has been doing well with no new toxicity we will spread her follow-up to 4 months.   - RTC in 4 months with blood work day of.      5/20/24:  - Overall patient is tolerating hydrea with no new toxicity.  - Hbg is at goal at 14.2.  - Hematocrit is 43.3.  - Platelets are normal.  - WBC is normal.   - At this point we will continue Hydrea 500 mg Monday-Friday and 1000 mg on Saturday and Sunday.  - No splenomegaly on exam.  - RTC in 4 months with labs.     9/30/24:  - hemoglobin at goal   - we discussed blood pressure is elevated, but nit at home   - no new toxicities from hydrea  - will continue intensive monitoring for hydrea side effects    - She sees her PCP later in the month  - RTC in four months with repeat labs     1/20/25:  - patient has bee started on a BP medication since her last visit, we dicussed this could be related to hydrea as its associated with some increase in BP.   - She denies any other new side effects, currently on a BP medication  that started yesterday  - She is being considered for possible intervention for her right hip arthritis.    - At this point, we will continue hydrea  at current dose, 1 tab mon-fri, 2 tabs on weekends.   - RTC in 3 months with labs.  - Refill for hydrea provided    4/29/25:   - CBC at goal   - She continues to tolerate Hydrea without new toxicity   - We discussed at this point, no need for further changes   - Will continue surveillance on Hydrea   - RTC in 4 months with labs        Reviewed ongoing medical problems and how they relate to her polycythemia vera, will continue long term monitoring.    RTC in 4 months  This  note has been transcribed using a medical scribe and there is a possibility of unintentional typing misprints    Diagnoses and all orders for this visit:  Essential thrombocythemia (Multi)  -     Clinic Appointment Request  -     Clinic Appointment Request; Future  -     CBC and Auto Differential; Future  -     Comprehensive metabolic panel; Future      Arabella Johnson MD  Hematology/Oncology  [facility]      Scribe Attestation  By signing my name below, I, Burt Bower, attest that this documentation has been prepared under the direction and in the presence of Arabella Johnson MD.  Time Spent  Prep time on day of patient encounter: 5 minutes  Time spent directly with patient, family or caregiver: 15 minutes  Additional Time Spent on Patient Care Activities: 5 minutes  Documentation Time: 5 minutes  Other Time Spent: 0 minutes  Total: 30 minutes

## 2025-04-29 ENCOUNTER — OFFICE VISIT (OUTPATIENT)
Dept: HEMATOLOGY/ONCOLOGY | Facility: CLINIC | Age: 80
End: 2025-04-29
Payer: MEDICARE

## 2025-04-29 ENCOUNTER — LAB (OUTPATIENT)
Dept: LAB | Facility: CLINIC | Age: 80
End: 2025-04-29
Payer: MEDICARE

## 2025-04-29 VITALS
SYSTOLIC BLOOD PRESSURE: 162 MMHG | TEMPERATURE: 97.7 F | RESPIRATION RATE: 16 BRPM | HEART RATE: 73 BPM | WEIGHT: 169.09 LBS | BODY MASS INDEX: 26.54 KG/M2 | OXYGEN SATURATION: 97 % | DIASTOLIC BLOOD PRESSURE: 78 MMHG

## 2025-04-29 DIAGNOSIS — D47.3 ESSENTIAL THROMBOCYTHEMIA (MULTI): ICD-10-CM

## 2025-04-29 LAB
ALBUMIN SERPL BCP-MCNC: 4.1 G/DL (ref 3.4–5)
ALP SERPL-CCNC: 56 U/L (ref 33–136)
ALT SERPL W P-5'-P-CCNC: 13 U/L (ref 7–45)
ANION GAP SERPL CALC-SCNC: 10 MMOL/L (ref 10–20)
AST SERPL W P-5'-P-CCNC: 15 U/L (ref 9–39)
BASOPHILS # BLD AUTO: 0.03 X10*3/UL (ref 0–0.1)
BASOPHILS NFR BLD AUTO: 0.6 %
BILIRUB SERPL-MCNC: 0.8 MG/DL (ref 0–1.2)
BUN SERPL-MCNC: 17 MG/DL (ref 6–23)
CALCIUM SERPL-MCNC: 9.1 MG/DL (ref 8.6–10.3)
CHLORIDE SERPL-SCNC: 104 MMOL/L (ref 98–107)
CO2 SERPL-SCNC: 30 MMOL/L (ref 21–32)
CREAT SERPL-MCNC: 0.94 MG/DL (ref 0.5–1.05)
EGFRCR SERPLBLD CKD-EPI 2021: 62 ML/MIN/1.73M*2
EOSINOPHIL # BLD AUTO: 0.07 X10*3/UL (ref 0–0.4)
EOSINOPHIL NFR BLD AUTO: 1.3 %
ERYTHROCYTE [DISTWIDTH] IN BLOOD BY AUTOMATED COUNT: 13.8 % (ref 11.5–14.5)
GLUCOSE SERPL-MCNC: 99 MG/DL (ref 74–99)
HCT VFR BLD AUTO: 43.9 % (ref 36–46)
HGB BLD-MCNC: 13.9 G/DL (ref 12–16)
HOLD SPECIMEN: NORMAL
IMM GRANULOCYTES # BLD AUTO: 0.01 X10*3/UL (ref 0–0.5)
IMM GRANULOCYTES NFR BLD AUTO: 0.2 % (ref 0–0.9)
LYMPHOCYTES # BLD AUTO: 1.47 X10*3/UL (ref 0.8–3)
LYMPHOCYTES NFR BLD AUTO: 27.8 %
MCH RBC QN AUTO: 37.2 PG (ref 26–34)
MCHC RBC AUTO-ENTMCNC: 31.7 G/DL (ref 32–36)
MCV RBC AUTO: 117 FL (ref 80–100)
MONOCYTES # BLD AUTO: 0.31 X10*3/UL (ref 0.05–0.8)
MONOCYTES NFR BLD AUTO: 5.9 %
NEUTROPHILS # BLD AUTO: 3.39 X10*3/UL (ref 1.6–5.5)
NEUTROPHILS NFR BLD AUTO: 64.2 %
NRBC BLD-RTO: ABNORMAL /100{WBCS}
PLATELET # BLD AUTO: 274 X10*3/UL (ref 150–450)
POTASSIUM SERPL-SCNC: 4 MMOL/L (ref 3.5–5.3)
PROT SERPL-MCNC: 6.6 G/DL (ref 6.4–8.2)
RBC # BLD AUTO: 3.74 X10*6/UL (ref 4–5.2)
SODIUM SERPL-SCNC: 140 MMOL/L (ref 136–145)
WBC # BLD AUTO: 5.3 X10*3/UL (ref 4.4–11.3)

## 2025-04-29 PROCEDURE — 1126F AMNT PAIN NOTED NONE PRSNT: CPT | Performed by: INTERNAL MEDICINE

## 2025-04-29 PROCEDURE — G2211 COMPLEX E/M VISIT ADD ON: HCPCS | Performed by: INTERNAL MEDICINE

## 2025-04-29 PROCEDURE — 99214 OFFICE O/P EST MOD 30 MIN: CPT | Performed by: INTERNAL MEDICINE

## 2025-04-29 PROCEDURE — 1159F MED LIST DOCD IN RCRD: CPT | Performed by: INTERNAL MEDICINE

## 2025-04-29 PROCEDURE — 36415 COLL VENOUS BLD VENIPUNCTURE: CPT

## 2025-04-29 PROCEDURE — 1123F ACP DISCUSS/DSCN MKR DOCD: CPT | Performed by: INTERNAL MEDICINE

## 2025-04-29 PROCEDURE — 3077F SYST BP >= 140 MM HG: CPT | Performed by: INTERNAL MEDICINE

## 2025-04-29 PROCEDURE — 85025 COMPLETE CBC W/AUTO DIFF WBC: CPT

## 2025-04-29 PROCEDURE — 84075 ASSAY ALKALINE PHOSPHATASE: CPT

## 2025-04-29 PROCEDURE — 3078F DIAST BP <80 MM HG: CPT | Performed by: INTERNAL MEDICINE

## 2025-04-29 ASSESSMENT — PAIN SCALES - GENERAL: PAINLEVEL_OUTOF10: 0-NO PAIN

## 2025-06-03 PROBLEM — M54.50 CHRONIC LOW BACK PAIN: Status: ACTIVE | Noted: 2025-02-04

## 2025-06-03 PROBLEM — M25.551 CHRONIC RIGHT HIP PAIN: Status: ACTIVE | Noted: 2025-02-04

## 2025-06-03 PROBLEM — G89.29 CHRONIC RIGHT HIP PAIN: Status: ACTIVE | Noted: 2025-02-04

## 2025-06-03 PROBLEM — G89.29 CHRONIC LOW BACK PAIN: Status: ACTIVE | Noted: 2025-02-04

## 2025-06-04 ENCOUNTER — APPOINTMENT (OUTPATIENT)
Dept: PRIMARY CARE | Facility: CLINIC | Age: 80
End: 2025-06-04
Payer: MEDICARE

## 2025-06-04 VITALS
BODY MASS INDEX: 26.53 KG/M2 | DIASTOLIC BLOOD PRESSURE: 76 MMHG | WEIGHT: 169 LBS | HEIGHT: 67 IN | TEMPERATURE: 97 F | SYSTOLIC BLOOD PRESSURE: 134 MMHG

## 2025-06-04 DIAGNOSIS — Z78.0 POST-MENOPAUSAL: ICD-10-CM

## 2025-06-04 DIAGNOSIS — E78.2 MIXED HYPERLIPIDEMIA: ICD-10-CM

## 2025-06-04 DIAGNOSIS — N18.31 CKD STAGE 3A, GFR 45-59 ML/MIN (MULTI): ICD-10-CM

## 2025-06-04 DIAGNOSIS — D47.3 ESSENTIAL THROMBOCYTHEMIA (MULTI): ICD-10-CM

## 2025-06-04 DIAGNOSIS — E03.9 HYPOTHYROIDISM, UNSPECIFIED TYPE: ICD-10-CM

## 2025-06-04 DIAGNOSIS — E55.9 VITAMIN D DEFICIENCY: ICD-10-CM

## 2025-06-04 DIAGNOSIS — Z00.00 ROUTINE GENERAL MEDICAL EXAMINATION AT HEALTH CARE FACILITY: Primary | ICD-10-CM

## 2025-06-04 DIAGNOSIS — I10 PRIMARY HYPERTENSION: ICD-10-CM

## 2025-06-04 DIAGNOSIS — M85.80 OSTEOPENIA, UNSPECIFIED LOCATION: ICD-10-CM

## 2025-06-04 PROCEDURE — 99214 OFFICE O/P EST MOD 30 MIN: CPT | Performed by: FAMILY MEDICINE

## 2025-06-04 PROCEDURE — 1160F RVW MEDS BY RX/DR IN RCRD: CPT | Performed by: FAMILY MEDICINE

## 2025-06-04 PROCEDURE — 3078F DIAST BP <80 MM HG: CPT | Performed by: FAMILY MEDICINE

## 2025-06-04 PROCEDURE — G0439 PPPS, SUBSEQ VISIT: HCPCS | Performed by: FAMILY MEDICINE

## 2025-06-04 PROCEDURE — 3075F SYST BP GE 130 - 139MM HG: CPT | Performed by: FAMILY MEDICINE

## 2025-06-04 PROCEDURE — 1170F FXNL STATUS ASSESSED: CPT | Performed by: FAMILY MEDICINE

## 2025-06-04 PROCEDURE — 1159F MED LIST DOCD IN RCRD: CPT | Performed by: FAMILY MEDICINE

## 2025-06-04 RX ORDER — LEVOTHYROXINE SODIUM 100 UG/1
100 TABLET ORAL
Qty: 90 TABLET | Refills: 3 | Status: SHIPPED | OUTPATIENT
Start: 2025-06-04

## 2025-06-04 RX ORDER — ATORVASTATIN CALCIUM 40 MG/1
40 TABLET, FILM COATED ORAL DAILY
Qty: 90 TABLET | Refills: 3 | Status: SHIPPED | OUTPATIENT
Start: 2025-06-04

## 2025-06-04 RX ORDER — AMLODIPINE BESYLATE 2.5 MG/1
2.5 TABLET ORAL DAILY
Qty: 90 TABLET | Refills: 1 | Status: SHIPPED | OUTPATIENT
Start: 2025-06-04 | End: 2025-12-01

## 2025-06-04 ASSESSMENT — ENCOUNTER SYMPTOMS
COUGH: 0
POLYPHAGIA: 0
DIARRHEA: 0
CHILLS: 0
WHEEZING: 0
CONSTIPATION: 0
ACTIVITY CHANGE: 0
SORE THROAT: 0
FREQUENCY: 0
WOUND: 0
POLYDIPSIA: 0
HEMATURIA: 0
ARTHRALGIAS: 1
NUMBNESS: 0
CONFUSION: 0
PALPITATIONS: 0
DYSURIA: 0
FEVER: 0
RHINORRHEA: 0
HEADACHES: 0
SHORTNESS OF BREATH: 0
ABDOMINAL PAIN: 0

## 2025-06-04 ASSESSMENT — ACTIVITIES OF DAILY LIVING (ADL)
TAKING_MEDICATION: INDEPENDENT
MANAGING_FINANCES: INDEPENDENT
DRESSING: INDEPENDENT
DOING_HOUSEWORK: INDEPENDENT
BATHING: INDEPENDENT
GROCERY_SHOPPING: INDEPENDENT

## 2025-06-04 NOTE — ASSESSMENT & PLAN NOTE
Orders:    atorvastatin (Lipitor) 40 mg tablet; Take 1 tablet (40 mg) by mouth once daily.    levothyroxine (Synthroid, Levoxyl) 100 mcg tablet; Take 1 tablet (100 mcg) by mouth once daily in the morning. Take before meals.

## 2025-06-04 NOTE — ASSESSMENT & PLAN NOTE
- labs ordered  Orders:    Lipid Panel; Future    TSH with reflex to Free T4 if abnormal; Future

## 2025-06-04 NOTE — ASSESSMENT & PLAN NOTE
- adequate control  - continue Amlodipine 2.5 mg Po daily   Orders:    amLODIPine (Norvasc) 2.5 mg tablet; Take 1 tablet (2.5 mg) by mouth once daily.

## 2025-06-04 NOTE — PROGRESS NOTES
"Subjective   Reason for Visit: Fernanda Sotelo is an 80 y.o. female here for a Medicare Wellness visit.     Past Medical, Surgical, and Family History reviewed and updated in chart.    Reviewed all medications by prescribing practitioner or clinical pharmacist (such as prescriptions, OTCs, herbal therapies and supplements) and documented in the medical record.    HPI    Admits to heartburn depending on what she eats. It resolves with Tums. On average a couple times weekly due to the food she eats.     She has seen ortho for intermittent right hip pain. Overall is doing well. Hurts mainly with overuse.     Patient Care Team:  Maria De Jesus Mckay DO as PCP - General  Maria De Jesus Mckay DO as PCP - [employer] PCP  Arabella Johnson MD as Consulting Physician (Hematology and Oncology)  Arabella Johnson MD as Consulting Physician (Hematology and Oncology)     Review of Systems   Constitutional:  Negative for activity change, chills and fever.   HENT:  Negative for congestion, hearing loss, rhinorrhea and sore throat.    Eyes:  Negative for visual disturbance.   Respiratory:  Negative for cough, shortness of breath and wheezing.    Cardiovascular:  Negative for chest pain, palpitations and leg swelling.   Gastrointestinal:  Negative for abdominal pain, constipation and diarrhea.        Admits to reflux   Endocrine: Negative for polydipsia, polyphagia and polyuria.   Genitourinary:  Negative for dysuria, frequency, hematuria and urgency.   Musculoskeletal:  Positive for arthralgias.   Skin:  Negative for rash and wound.   Neurological:  Negative for numbness and headaches.   Psychiatric/Behavioral:  Negative for confusion.        Objective   Vitals:  /76 (BP Location: Right arm, Patient Position: Sitting)   Temp 36.1 °C (97 °F)   Ht 1.702 m (5' 7\")   Wt 76.7 kg (169 lb)   BMI 26.47 kg/m²       Physical Exam  Constitutional:       Appearance: Normal appearance.   HENT:      Head: Normocephalic.   Eyes:      " Conjunctiva/sclera: Conjunctivae normal.   Cardiovascular:      Rate and Rhythm: Normal rate and regular rhythm.   Pulmonary:      Effort: Pulmonary effort is normal.      Breath sounds: Normal breath sounds.   Abdominal:      General: Abdomen is flat.      Palpations: Abdomen is soft.   Skin:     General: Skin is warm.      Capillary Refill: Capillary refill takes less than 2 seconds.   Neurological:      General: No focal deficit present.      Mental Status: She is alert.   Psychiatric:         Mood and Affect: Mood normal.       Skin: left temporal area with 8mmx 9mm   Assessment & Plan  Routine general medical examination at health care facility    Orders:    1 Year Follow Up In Primary Care - Wellness Exam    1 Year Follow Up In Primary Care - Wellness Exam; Future    Primary hypertension  - adequate control  - continue Amlodipine 2.5 mg Po daily   Orders:    amLODIPine (Norvasc) 2.5 mg tablet; Take 1 tablet (2.5 mg) by mouth once daily.    Mixed hyperlipidemia  - labs ordered  Orders:    Lipid Panel; Future    TSH with reflex to Free T4 if abnormal; Future    Vitamin D deficiency  - labs ordered  Orders:    Vitamin D 25-Hydroxy,Total (for eval of Vitamin D levels); Future    Osteopenia, unspecified location    Orders:    XR DEXA bone density; Future    Vitamin D 25-Hydroxy,Total (for eval of Vitamin D levels); Future    Hypothyroidism, unspecified type    Orders:    atorvastatin (Lipitor) 40 mg tablet; Take 1 tablet (40 mg) by mouth once daily.    levothyroxine (Synthroid, Levoxyl) 100 mcg tablet; Take 1 tablet (100 mcg) by mouth once daily in the morning. Take before meals.    CKD stage 3a, GFR 45-59 ml/min (Multi)  - has been normal 2025, will continue to monitor       Essential thrombocythemia (Multi)  - follows with Dr. Johnson       Post-menopausal    Orders:    XR DEXA bone density; Future     CBC and CMP active orders in EMR from her Hematologist, will add lipid and TSH testing  Dexa ordered (last 2023)-  osteopenia  Last colon cancer screening 11/28/2017, colonoscopy, due 10 years (11/2027)  RSV vaccination recommended as well as tetanus booster  Pneumococcal vaccination up ot date  Shingrix series complete  Recommend COVID-19 and Influenza boosters when due

## 2025-06-04 NOTE — ASSESSMENT & PLAN NOTE
Orders:    XR DEXA bone density; Future    Vitamin D 25-Hydroxy,Total (for eval of Vitamin D levels); Future

## 2025-06-06 ENCOUNTER — HOSPITAL ENCOUNTER (OUTPATIENT)
Dept: RADIOLOGY | Facility: CLINIC | Age: 80
Discharge: HOME | End: 2025-06-06
Payer: MEDICARE

## 2025-06-06 DIAGNOSIS — Z78.0 POST-MENOPAUSAL: ICD-10-CM

## 2025-06-06 DIAGNOSIS — M85.80 OSTEOPENIA, UNSPECIFIED LOCATION: ICD-10-CM

## 2025-06-06 PROCEDURE — 77080 DXA BONE DENSITY AXIAL: CPT

## 2025-06-07 LAB
25(OH)D3+25(OH)D2 SERPL-MCNC: 53 NG/ML (ref 30–100)
CHOLEST SERPL-MCNC: 168 MG/DL
CHOLEST/HDLC SERPL: 1.9 (CALC)
HDLC SERPL-MCNC: 88 MG/DL
LDLC SERPL CALC-MCNC: 67 MG/DL (CALC)
NONHDLC SERPL-MCNC: 80 MG/DL (CALC)
TRIGL SERPL-MCNC: 44 MG/DL
TSH SERPL-ACNC: 1.79 MIU/L (ref 0.4–4.5)

## 2025-08-29 ENCOUNTER — LAB (OUTPATIENT)
Dept: LAB | Facility: CLINIC | Age: 80
End: 2025-08-29
Payer: MEDICARE

## 2025-08-29 ENCOUNTER — OFFICE VISIT (OUTPATIENT)
Dept: HEMATOLOGY/ONCOLOGY | Facility: CLINIC | Age: 80
End: 2025-08-29
Payer: MEDICARE

## 2025-08-29 VITALS
DIASTOLIC BLOOD PRESSURE: 81 MMHG | RESPIRATION RATE: 16 BRPM | OXYGEN SATURATION: 93 % | HEART RATE: 78 BPM | WEIGHT: 168.87 LBS | TEMPERATURE: 97.7 F | BODY MASS INDEX: 26.45 KG/M2 | SYSTOLIC BLOOD PRESSURE: 153 MMHG

## 2025-08-29 DIAGNOSIS — D47.3 ESSENTIAL THROMBOCYTHEMIA (MULTI): ICD-10-CM

## 2025-08-29 PROCEDURE — 99214 OFFICE O/P EST MOD 30 MIN: CPT | Performed by: INTERNAL MEDICINE

## 2025-08-29 PROCEDURE — 1159F MED LIST DOCD IN RCRD: CPT | Performed by: INTERNAL MEDICINE

## 2025-08-29 PROCEDURE — 1126F AMNT PAIN NOTED NONE PRSNT: CPT | Performed by: INTERNAL MEDICINE

## 2025-08-29 PROCEDURE — 3079F DIAST BP 80-89 MM HG: CPT | Performed by: INTERNAL MEDICINE

## 2025-08-29 PROCEDURE — 3077F SYST BP >= 140 MM HG: CPT | Performed by: INTERNAL MEDICINE

## 2025-08-29 PROCEDURE — G2211 COMPLEX E/M VISIT ADD ON: HCPCS | Performed by: INTERNAL MEDICINE

## 2025-08-29 ASSESSMENT — PAIN SCALES - GENERAL: PAINLEVEL_OUTOF10: 0-NO PAIN

## 2025-12-09 ENCOUNTER — APPOINTMENT (OUTPATIENT)
Dept: PRIMARY CARE | Facility: CLINIC | Age: 80
End: 2025-12-09
Payer: MEDICARE